# Patient Record
Sex: FEMALE | Race: WHITE | NOT HISPANIC OR LATINO | Employment: FULL TIME | URBAN - METROPOLITAN AREA
[De-identification: names, ages, dates, MRNs, and addresses within clinical notes are randomized per-mention and may not be internally consistent; named-entity substitution may affect disease eponyms.]

---

## 2018-04-22 ENCOUNTER — HOSPITAL ENCOUNTER (EMERGENCY)
Facility: HOSPITAL | Age: 43
Discharge: HOME | End: 2018-04-22
Attending: EMERGENCY MEDICINE | Admitting: EMERGENCY MEDICINE
Payer: COMMERCIAL

## 2018-04-22 ENCOUNTER — APPOINTMENT (EMERGENCY)
Dept: CT IMAGING | Facility: HOSPITAL | Age: 43
End: 2018-04-22
Payer: COMMERCIAL

## 2018-04-22 VITALS
TEMPERATURE: 97.8 F | DIASTOLIC BLOOD PRESSURE: 84 MMHG | WEIGHT: 200 LBS | RESPIRATION RATE: 18 BRPM | BODY MASS INDEX: 33.32 KG/M2 | HEART RATE: 82 BPM | OXYGEN SATURATION: 99 % | SYSTOLIC BLOOD PRESSURE: 133 MMHG | HEIGHT: 65 IN

## 2018-04-22 DIAGNOSIS — R91.8 MULTIPLE PULMONARY NODULES: ICD-10-CM

## 2018-04-22 DIAGNOSIS — S39.012A STRAIN OF MUSCLE, FASCIA AND TENDON OF LOWER BACK, INITIAL ENCOUNTER: ICD-10-CM
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
## 2018-04-22 DIAGNOSIS — S09.90XA MINOR HEAD INJURY WITHOUT LOSS OF CONSCIOUSNESS, INITIAL ENCOUNTER: ICD-10-CM

## 2018-04-22 LAB
ANION GAP BLD CALC-SCNC: 16 MMOL/L (ref 4–13)
BACTERIA UR QL AUTO: ABNORMAL /HPF
BILIRUB UR QL STRIP: NEGATIVE
BUN BLD-MCNC: 19 MG/DL (ref 5–25)
CA-I BLD-SCNC: 1.15 MMOL/L (ref 1.12–1.32)
CHLORIDE BLD-SCNC: 98 MMOL/L (ref 100–108)
CLARITY UR: CLEAR
COLOR UR: YELLOW
CREAT BLD-MCNC: 0.9 MG/DL (ref 0.6–1.3)
EXT PREG TEST URINE: NEGATIVE
GFR SERPL CREATININE-BSD FRML MDRD: 79 ML/MIN/1.73SQ M
GLUCOSE SERPL-MCNC: 120 MG/DL (ref 65–140)
GLUCOSE UR STRIP-MCNC: NEGATIVE MG/DL
HCT VFR BLD CALC: 41 % (ref 34.8–46.1)
HGB BLDA-MCNC: 13.9 G/DL (ref 11.5–15.4)
HGB UR QL STRIP.AUTO: ABNORMAL
HOLD SPECIMEN: NORMAL
HOLD SPECIMEN: NORMAL
KETONES UR STRIP-MCNC: NEGATIVE MG/DL
LEUKOCYTE ESTERASE UR QL STRIP: NEGATIVE
NITRITE UR QL STRIP: NEGATIVE
NON-SQ EPI CELLS URNS QL MICRO: ABNORMAL /HPF
PCO2 BLD: 30 MMOL/L (ref 21–32)
PH UR STRIP.AUTO: 7 [PH] (ref 4.5–8)
POTASSIUM BLD-SCNC: 3.2 MMOL/L (ref 3.5–5.3)
PROT UR STRIP-MCNC: NEGATIVE MG/DL
RBC #/AREA URNS AUTO: ABNORMAL /HPF
SODIUM BLD-SCNC: 140 MMOL/L (ref 136–145)
SP GR UR STRIP.AUTO: 1.01 (ref 1–1.03)
SPECIMEN SOURCE: ABNORMAL
UROBILINOGEN UR QL STRIP.AUTO: 0.2 E.U./DL
WBC #/AREA URNS AUTO: ABNORMAL /HPF

## 2018-04-22 PROCEDURE — 74177 CT ABD & PELVIS W/CONTRAST: CPT

## 2018-04-22 PROCEDURE — 81025 URINE PREGNANCY TEST: CPT | Performed by: EMERGENCY MEDICINE

## 2018-04-22 PROCEDURE — 96374 THER/PROPH/DIAG INJ IV PUSH: CPT

## 2018-04-22 PROCEDURE — 36415 COLL VENOUS BLD VENIPUNCTURE: CPT | Performed by: EMERGENCY MEDICINE

## 2018-04-22 PROCEDURE — 81001 URINALYSIS AUTO W/SCOPE: CPT | Performed by: EMERGENCY MEDICINE

## 2018-04-22 PROCEDURE — 80047 BASIC METABLC PNL IONIZED CA: CPT

## 2018-04-22 PROCEDURE — 96361 HYDRATE IV INFUSION ADD-ON: CPT

## 2018-04-22 PROCEDURE — 70450 CT HEAD/BRAIN W/O DYE: CPT

## 2018-04-22 PROCEDURE — 85014 HEMATOCRIT: CPT

## 2018-04-22 PROCEDURE — 72125 CT NECK SPINE W/O DYE: CPT

## 2018-04-22 PROCEDURE — 71260 CT THORAX DX C+: CPT

## 2018-04-22 PROCEDURE — 99285 EMERGENCY DEPT VISIT HI MDM: CPT

## 2018-04-22 RX ORDER — POTASSIUM CHLORIDE 20 MEQ/1
40 TABLET, EXTENDED RELEASE ORAL ONCE
Status: COMPLETED | OUTPATIENT
Start: 2018-04-22 | End: 2018-04-22

## 2018-04-22 RX ORDER — LIDOCAINE 50 MG/G
1 PATCH TOPICAL DAILY PRN
Qty: 15 PATCH | Refills: 0 | Status: SHIPPED | OUTPATIENT
Start: 2018-04-22 | End: 2021-02-15 | Stop reason: ALTCHOICE

## 2018-04-22 RX ORDER — NAPROXEN 500 MG/1
500 TABLET ORAL EVERY 12 HOURS PRN
Qty: 20 TABLET | Refills: 0 | Status: SHIPPED | OUTPATIENT
Start: 2018-04-22 | End: 2021-02-15 | Stop reason: ALTCHOICE

## 2018-04-22 RX ORDER — CYCLOBENZAPRINE HCL 10 MG
10 TABLET ORAL 3 TIMES DAILY PRN
Qty: 12 TABLET | Refills: 0 | Status: SHIPPED | OUTPATIENT
Start: 2018-04-22 | End: 2021-02-15 | Stop reason: ALTCHOICE

## 2018-04-22 RX ADMIN — POTASSIUM CHLORIDE 40 MEQ: 1500 TABLET, EXTENDED RELEASE ORAL at 14:50

## 2018-04-22 RX ADMIN — IOHEXOL 100 ML: 350 INJECTION, SOLUTION INTRAVENOUS at 14:40

## 2018-04-22 RX ADMIN — HYDROMORPHONE HYDROCHLORIDE 0.5 MG: 1 INJECTION, SOLUTION INTRAMUSCULAR; INTRAVENOUS; SUBCUTANEOUS at 13:55

## 2018-04-22 RX ADMIN — SODIUM CHLORIDE 1000 ML: 0.9 INJECTION, SOLUTION INTRAVENOUS at 13:53

## 2018-04-22 NOTE — ED PROVIDER NOTES
History  Chief Complaint   Patient presents with    Blunt Trauma     Pt reports falling down 10-12 steps and hitting her forehead on the fender of the car around 1100 this morning  Pt with c/o of headache, neck, and back pain with dizziness  Pt denies LOC or N/V  No blood thinners  Patient is a 45-year-old female with past medical and surgical history of hypertension, GERD and cholecystectomy, presents to the emergency department after she fell down 10-12 steps, complaining of frontal headache, left-sided neck pain radiating to the left shoulder as well as right flank pain  Patient states she was walking up steps after coming in from the car her dog jumped on her causing her to fall backwards  She states she tried to twist while falling and ultimately fell down about 10-12 steps  When she landed she struck the forehead against her minivan and front bumper  She also states that her back bent backwards in a "U" and she felt acute pain in the right flank that she describes as a warm gush of fluid  The pain in her right flank radiates into her right lower abdomen and across into the left side of her back  She denies loss of consciousness  She reports mild frontal headache but denies any visual disturbance, tinnitus, change in hearing, dizziness or so she did nausea or vomiting  She also complains of left-sided neck pain that radiates into the left shoulder region  Denies any posterior/midline neck pain  She denies any chest pain, palpitations, dyspnea or pain with breathing, extremity pain/paresthesias/weakness, change in bowel habits, blood per rectum, dysuria, difficulty urinating or hematuria  Rest of review of systems otherwise negative  Patient denies being on any blood thinners          History provided by:  Patient   used: No        None       Past Medical History:   Diagnosis Date    GERD (gastroesophageal reflux disease)     Hypertension        Past Surgical History: Procedure Laterality Date    CHOLECYSTECTOMY         History reviewed  No pertinent family history  I have reviewed and agree with the history as documented  Social History   Substance Use Topics    Smoking status: Never Smoker    Smokeless tobacco: Never Used    Alcohol use Yes      Comment: Socially        Review of Systems   Constitutional: Negative for appetite change, chills, diaphoresis, fatigue and fever  HENT: Negative for congestion, ear pain, hearing loss, rhinorrhea, sore throat and tinnitus  Eyes: Negative for photophobia, pain and visual disturbance  Respiratory: Negative for cough, chest tightness, shortness of breath and wheezing  Cardiovascular: Negative for chest pain and palpitations  Gastrointestinal: Positive for abdominal pain  Negative for abdominal distention, blood in stool, constipation, diarrhea, nausea and vomiting  Genitourinary: Positive for flank pain  Negative for difficulty urinating, dysuria, frequency, hematuria, pelvic pain and vaginal bleeding  Musculoskeletal: Positive for back pain and neck pain  Negative for gait problem, joint swelling and neck stiffness  Skin: Negative for color change, pallor, rash and wound  Allergic/Immunologic: Negative for immunocompromised state  Neurological: Positive for headaches  Negative for dizziness, seizures, syncope, facial asymmetry, speech difficulty, weakness, light-headedness and numbness  Hematological: Negative for adenopathy  Does not bruise/bleed easily  Psychiatric/Behavioral: Negative for confusion and decreased concentration  All other systems reviewed and are negative        Physical Exam  ED Triage Vitals   Temperature Pulse Respirations Blood Pressure SpO2   04/22/18 1315 04/22/18 1326 04/22/18 1326 04/22/18 1326 04/22/18 1326   97 8 °F (36 6 °C) 92 20 165/92 97 %      Temp Source Heart Rate Source Patient Position - Orthostatic VS BP Location FiO2 (%)   04/22/18 1315 04/22/18 1326 04/22/18 1326 04/22/18 1326 --   Oral Monitor Sitting Right arm       Pain Score       04/22/18 1326       6         Vitals:    04/22/18 1315 04/22/18 1326 04/22/18 1451 04/22/18 1525   BP:  165/92 151/81 133/84   BP Location:  Right arm  Left arm   Pulse:  92 99 82   Resp:  20 18 18   Temp: 97 8 °F (36 6 °C)      TempSrc: Oral      SpO2:  97% 98% 99%   Weight:  90 7 kg (200 lb)     Height:  5' 4 5" (1 638 m)       Physical Exam   Constitutional: She is oriented to person, place, and time  She appears well-developed and well-nourished  No distress  HENT:   Head: Normocephalic and atraumatic  Right Ear: External ear normal    Left Ear: External ear normal    Nose: Nose normal    Mouth/Throat: Oropharynx is clear and moist  No oropharyngeal exudate  No significant scalp contusion, hematoma or laceration  Mild frontal bone tenderness  No other facial bone tenderness  Eyes: Conjunctivae and EOM are normal  Pupils are equal, round, and reactive to light  Neck: Normal range of motion  Neck supple  No JVD present  No midline cervical spine tenderness  Full range of motion of neck  Cardiovascular: Normal rate, regular rhythm, normal heart sounds and intact distal pulses  Exam reveals no gallop and no friction rub  No murmur heard  Pulmonary/Chest: Effort normal and breath sounds normal  No respiratory distress  She has no wheezes  She has no rales  She exhibits no tenderness  Abdominal: Soft  Bowel sounds are normal  She exhibits no distension  There is no tenderness  There is no rebound and no guarding  Musculoskeletal: Normal range of motion  She exhibits no edema or tenderness  No midline cervical, thoracic or lumbar spine tenderness  No step-offs  Right flank/thoracolumbar paravertebral region tenderness  No overlying ecchymosis or significant soft tissue swelling  Lymphadenopathy:     She has no cervical adenopathy  Neurological: She is alert and oriented to person, place, and time   No cranial nerve deficit  No gross motor or sensory deficits  5/5 strength throughout  Skin: Skin is warm and dry  No rash noted  She is not diaphoretic  No erythema  No pallor  Psychiatric: She has a normal mood and affect  Her behavior is normal    Nursing note and vitals reviewed  ED Medications  Medications   sodium chloride 0 9 % bolus 1,000 mL (1,000 mL Intravenous New Bag 4/22/18 1353)   HYDROmorphone (DILAUDID) injection 0 5 mg (0 5 mg Intravenous Given 4/22/18 1355)   potassium chloride (K-DUR,KLOR-CON) CR tablet 40 mEq (40 mEq Oral Given 4/22/18 1450)   iohexol (OMNIPAQUE) 350 MG/ML injection (MULTI-DOSE) 100 mL (100 mL Intravenous Given 4/22/18 1440)       Diagnostic Studies  Results Reviewed     Procedure Component Value Units Date/Time    Urine Microscopic [[id number]]  (Abnormal) Collected:  04/22/18 1414    Lab Status:  Final result Specimen:  Urine from Urine, Clean Catch Updated:  04/22/18 1427     RBC, UA 0-1 (A) /hpf      WBC, UA None Seen /hpf      Epithelial Cells Occasional /hpf      Bacteria, UA Occasional /hpf     [place] draw [[id number]] Collected:  04/22/18 1400    Lab Status: In process Specimen:  Blood Updated:  04/22/18 1423    Narrative: The following orders were created for panel order [place] draw    Procedure                               Abnormality         Status                     ---------                               -----------         ------                     Benito Askew Top on OFPZ[[id number]]                            In process                 Gold top on NBIU[[id number]]                                  In process                 Green / Yellow tube on VMKQ[[id number]]                       In process                 Green / Black tube on OZGB[[id number]]                        In process                 Lavender Top 3 ml on LSVQ[[id number]]                         In process                 Lavender Top 7ml on IYAE[[id number]]                          Final result Please view results for these tests on the individual orders  UA w Reflex to Microscopic [[id number]]  (Abnormal) Collected:  04/22/18 1414    Lab Status:  Final result Specimen:  Urine from Urine, Clean Catch Updated:  04/22/18 1420     Color, UA Yellow     Clarity, UA Clear     Specific Gravity, UA 1 010     pH, UA 7 0     Leukocytes, UA Negative     Nitrite, UA Negative     Protein, UA Negative mg/dl      Glucose, UA Negative mg/dl      Ketones, UA Negative mg/dl      Urobilinogen, UA 0 2 E U /dl      Bilirubin, UA Negative     Blood, UA Trace-Intact (A)    POCT pregnancy, urine [[id number]]  (Normal) Resulted:  04/22/18 1417    Lab Status:  Final result Updated:  04/22/18 1417     EXT PREG TEST UR (Ref: Negative) negative    POCT Chem 8+ [[id number]]  (Abnormal) Collected:  04/22/18 1410    Lab Status:  Final result Updated:  04/22/18 1415     SODIUM, I-STAT 140 mmol/l      Potassium, i-STAT 3 2 (L) mmol/L      Chloride, istat 98 (L) mmol/L      CO2, i-STAT 30 mmol/L      Anion Gap, Istat 16 (H) mmol/L      Calcium, Ionized i-STAT 1 15 mmol/L      BUN, I-STAT 19 mg/dl      Creatinine, i-STAT 0 9 mg/dl      eGFR 79 ml/min/1 73sq m      Glucose, i-STAT 120 mg/dl      Hct, i-STAT 41 %      Hgb, i-STAT 13 9 g/dl      Specimen Type VENOUS                 CT chest abdomen pelvis w contrast   Final Result by Eri Boston DO (04/22 1521)   No visceral or osseous injury identified  Scattered tiny pulmonary nodules, largest measuring 3 mm  Based on current Fleischner Society 2017 Guidelines on incidental pulmonary nodule, no routine follow-up is needed if the patient is considered low risk for lung cancer  If the patient is    considered high risk for lung cancer, 12 month follow-up non-contrast chest CT is recommended  Workstation performed: [id number]         CT head without contrast   Final Result by Eri Boston DO (04/22 1520)   No acute intracranial abnormality                    Workstation performed: [id number]         CT cervical spine without contrast   Final Result by Eri Boston DO (04/22 1519)   No cervical spine fracture or traumatic malalignment  Workstation performed: [id number]                    Procedures  Procedures       Phone Contacts  ED Phone Contact    ED Course  ED Course as of Apr 22 1538   Sun Apr 22, 2018   1418 Hemoglobin, Istat: 13 9   1418 eGFR: 79   1418 Will give 40mEq Kdur  POTASSIUM,I-STAT: (!) 3 2   1421 Blood, UA: (!) Trace-Intact   1433 RBC, UA: (!) 0-1   1526 CT scans unremarkable  No acute injury  Updated patient about incidental pulmonary nodules  Patient is not a smoker and has never been a smoker however cancer runs in the family (no h/o lung cancer)  Advised PCP f/u within 1 year in regards to this  From a trauma standpoint, no acute injury  Discussed ED return parameters  MDM  Number of Diagnoses or Management Options  Diagnosis management comments: 27-year-old female presents status post fall down 10-12 steps complaining of right flank pain radiating to abdomen and across back, headache and left-sided neck pain  Based on mechanism of injury, will obtain a CT scan of the head to rule out intracranial bleeding however very low risk overall  Will obtain CT scan of the cervical spine as well as CT scan of the chest, abdomen and pelvis to rule out kidney or intra-abdominal injury as well as spine fracture  Will check Chem i-STAT for kidney function as well as hemoglobin  Will check UA and urine pregnancy test prior to CT  Will give IV fluid bolus and 0 5 mg of IV Dilaudid for pain control         Amount and/or Complexity of Data Reviewed  Clinical lab tests: ordered and reviewed  Tests in the radiology section of CPT®: ordered and reviewed  Independent visualization of images, tracings, or specimens: yes      CritCare Time    Disposition  Final diagnoses:   Fall down steps, initial encounter   Minor head injury without loss of consciousness, initial encounter   Strain of muscle, fascia and tendon of lower back, initial encounter   Multiple pulmonary nodules     Time reflects when diagnosis was documented in both MDM as applicable and the Disposition within this note     Time User Action Codes Description Comment    4/22/2018  3:35 PM Court ACOSTA Add Su Wood Fall down steps, initial encounter     4/22/2018  3:35 PM Court Umair ACOSTA Add [S09 [id number]] Minor head injury without loss of consciousness, initial encounter     4/22/2018  3:35 PM Court Umair ACOSTA Add [[id number]] Strain of muscle, fascia and tendon of lower back, initial encounter     4/22/2018  3:36 PM Court Umair ACOSTA Add [R91 [id number]] Multiple pulmonary nodules       ED Disposition     ED Disposition Condition Comment    Discharge  Timhernando Bacon discharge to home/self care      Condition at discharge: Stable        Follow-up Information     Follow up With Specialties Details Why Contact Info Additional Information    Family doctor  Schedule an appointment as soon as possible for a visit       [facility] Emergency Medicine Go to If symptoms worsen [address] [place], [place] [id number]  [phone] [place] [facility], [address] [place] [address], [id number]        Patient's Medications   Discharge Prescriptions    CYCLOBENZAPRINE (FLEXERIL) 10 MG TABLET    Take 1 tablet (10 mg total) by mouth 3 (three) times a day as needed for muscle spasms       Start Date: 4/22/2018 End Date: --       Order Dose: 10 mg       Quantity: 12 tablet    Refills: 0    LIDOCAINE (LIDODERM) 5 %    Place 1 patch on the skin daily as needed for mild pain or moderate pain Remove & Discard patch within 12 hours or as directed by MD       Start Date: 4/22/2018 End Date: --       Order Dose: 1 patch       Quantity: 15 patch    Refills: 0    NAPROXEN (NAPROSYN) 500 MG TABLET    Take 1 tablet (500 mg total) by mouth every 12 (twelve) hours as needed for mild pain, moderate pain or headaches       Start Date: 4/22/2018 End Date: --       Order Dose: 500 mg       Quantity: 20 tablet    Refills: 0     No discharge procedures on file      ED Provider  Electronically Signed by           Abrahan Vyas DO  04/22/18 1535

## 2018-04-22 NOTE — DISCHARGE INSTRUCTIONS
Head Injury   WHAT YOU NEED TO KNOW:   A head injury is most often caused by a blow to the head  This may occur from a fall, bicycle injury, sports injury, being struck in the head, or a motor vehicle accident  DISCHARGE INSTRUCTIONS:   Call 911 or have someone else call for any of the following:   · You cannot be woken  · You have a seizure  · You stop responding to others or you faint  · You have blurry or double vision  · Your speech becomes slurred or confused  · You have arm or leg weakness, loss of feeling, or new problems with coordination  · Your pupils are larger than usual or one pupil is a different size than the other  · You have blood or clear fluid coming out of your ears or nose  Seek care immediately if:   · You have repeated or forceful vomiting  · You feel confused  · Your headache gets worse or becomes severe  · You or someone caring for you notices that you are harder to wake than usual   Contact your healthcare provider if:   · Your symptoms last longer than 6 weeks after the injury  · You have questions or concerns about your condition or care  Medicines:   · Acetaminophen  decreases pain  Acetaminophen is available without a doctor's order  Ask how much to take and how often to take it  Follow directions  Acetaminophen can cause liver damage if not taken correctly  · Take your medicine as directed  Contact your healthcare provider if you think your medicine is not helping or if you have side effects  Tell him or her if you are allergic to any medicine  Keep a list of the medicines, vitamins, and herbs you take  Include the amounts, and when and why you take them  Bring the list or the pill bottles to follow-up visits  Carry your medicine list with you in case of an emergency  Self-care:   · Rest  or do quiet activities for 24 to 48 hours  Limit your time watching TV, using the computer, or doing tasks that require a lot of thinking   Slowly return to your normal activities as directed  Do not play sports or do activities that may cause you to get hit in the head  Ask your healthcare provider when you can return to sports  · Apply ice  on your head for 15 to 20 minutes every hour or as directed  Use an ice pack, or put crushed ice in a plastic bag  Cover it with a towel before you apply it to your skin  Ice helps prevent tissue damage and decreases swelling and pain  · Have someone stay with you for 24 hours  or as directed  This person can monitor you for complications and call 737  When you are awake the person should ask you a few questions to see if you are thinking clearly  An example would be to ask your name or your address  Prevent another head injury:   · Wear a helmet that fits properly  Do this when you play sports, or ride a bike, scooter, or skateboard  Helmets help decrease your risk of a serious head injury  Talk to your healthcare provider about other ways you can protect yourself if you play sports  · Wear your seat belt every time you are in a car  This helps to decrease your risk for a head injury if you are in a car accident  Follow up with your healthcare provider as directed:  Write down your questions so you remember to ask them during your visits  © 2017 [employer]  Information is for End User's use only and may not be sold, redistributed or otherwise used for commercial purposes  All illustrations and images included in CareNotes® are the copyrighted property of [employer]  or [employer]  The above information is an  only  It is not intended as medical advice for individual conditions or treatments  Talk to your doctor, nurse or pharmacist before following any medical regimen to see if it is safe and effective for you  Low Back Strain   WHAT YOU NEED TO KNOW:   Low back strain is an injury to your lower back muscles or tendons   Tendons are strong tissues that connect muscles to bones  The lower back supports most of your body weight and helps you move, twist, and bend  DISCHARGE INSTRUCTIONS:   Seek care immediately if:   · You hear or feel a pop in your lower back  · You have increased swelling or pain in your lower back  · You have trouble moving your legs  · Your legs are numb  Contact your healthcare provider if:   · You have a fever  · Your pain does not go away, even after treatment  · You have questions or concerns about your condition or care  Medicines: The following medicines may be ordered by your healthcare provider:  · Acetaminophen decreases pain and fever  It is available without a doctor's order  Ask how much to take and how often to take it  Follow directions  Acetaminophen can cause liver damage if not taken correctly  · NSAIDs , such as ibuprofen, help decrease swelling, pain, and fever  This medicine is available with or without a doctor's order  NSAIDs can cause stomach bleeding or kidney problems in certain people  If you take blood thinner medicine, always ask your healthcare provider if NSAIDs are safe for you  Always read the medicine label and follow directions  · Muscle relaxers  help decrease pain and muscle spasms  · Prescription pain medicine  may be given  Ask how to take this medicine safely  · Take your medicine as directed  Contact your healthcare provider if you think your medicine is not helping or if you have side effects  Tell him or her if you are allergic to any medicine  Keep a list of the medicines, vitamins, and herbs you take  Include the amounts, and when and why you take them  Bring the list or the pill bottles to follow-up visits  Carry your medicine list with you in case of an emergency  Self-care:   · Rest  as directed  You may need to rest in bed for a period of time after your injury  Do not lift heavy objects  · Apply ice  on your back for 15 to 20 minutes every hour or as directed   Use an ice pack, or put crushed ice in a plastic bag  Cover it with a towel  Ice helps prevent tissue damage and decreases swelling and pain  · Apply heat  on your lower back for 20 to 30 minutes every 2 hours for as many days as directed  Heat helps decrease pain and muscle spasms  · Slowly start to increase your activity  as the pain decreases, or as directed  Prevent another low back strain:   · Use correct body movements  ¨ Bend at the hips and knees when you  objects  Do not bend from the waist  Use your leg muscles as you lift the load  Do not use your back  Keep the object close to your chest as you lift it  Try not to twist or lift anything above your waist     ¨ Change your position often when you stand for long periods of time  Rest one foot on a small box or footrest, and then switch to the other foot often  ¨ Try not to sit for long periods of time  When you do, sit in a straight-backed chair with your feet flat on the floor  ¨ Never reach, pull, or push while you are sitting  · Warm up before you exercise  Do exercises that strengthen your back muscles  Ask your healthcare provider about the best exercise plan for you  · Maintain a healthy weight  Ask your healthcare provider how much you should weigh  Ask him to help you create a weight loss plan if you are overweight  Follow up with your healthcare provider as directed:  Write down your questions so you remember to ask them during your visits  © 2017 [facility] Information is for End User's use only and may not be sold, redistributed or otherwise used for commercial purposes  All illustrations and images included in CareNotes® are the copyrighted property of [employer] M [employer] Inc  or [employer]  The above information is an  only  It is not intended as medical advice for individual conditions or treatments   Talk to your doctor, nurse or pharmacist before following any medical regimen to see if it is safe and effective for you

## 2021-02-15 ENCOUNTER — OFFICE VISIT (OUTPATIENT)
Dept: GASTROENTEROLOGY | Facility: CLINIC | Age: 46
End: 2021-02-15
Payer: COMMERCIAL

## 2021-02-15 VITALS
DIASTOLIC BLOOD PRESSURE: 82 MMHG | HEIGHT: 64 IN | HEART RATE: 84 BPM | SYSTOLIC BLOOD PRESSURE: 124 MMHG | BODY MASS INDEX: 37.22 KG/M2 | WEIGHT: 218 LBS

## 2021-02-15 DIAGNOSIS — K21.9 GASTROESOPHAGEAL REFLUX DISEASE, UNSPECIFIED WHETHER ESOPHAGITIS PRESENT: Primary | ICD-10-CM

## 2021-02-15 DIAGNOSIS — Z12.11 SCREENING FOR COLON CANCER: ICD-10-CM

## 2021-02-15 PROCEDURE — 99204 OFFICE O/P NEW MOD 45 MIN: CPT | Performed by: PHYSICIAN ASSISTANT

## 2021-02-15 RX ORDER — OMEPRAZOLE 40 MG/1
40 CAPSULE, DELAYED RELEASE ORAL DAILY
COMMUNITY
Start: 2021-01-07

## 2021-02-15 RX ORDER — AMLODIPINE, VALSARTAN AND HYDROCHLOROTHIAZIDE 10; 320; 25 MG/1; MG/1; MG/1
1 TABLET ORAL
COMMUNITY
Start: 2021-01-26

## 2021-02-15 RX ORDER — LIRAGLUTIDE 6 MG/ML
3 INJECTION, SOLUTION SUBCUTANEOUS DAILY
COMMUNITY
Start: 2021-01-26

## 2021-02-15 RX ORDER — DIPHENOXYLATE HYDROCHLORIDE AND ATROPINE SULFATE 2.5; .025 MG/1; MG/1
1 TABLET ORAL DAILY
COMMUNITY

## 2021-02-15 NOTE — PROGRESS NOTES
[facility] - Outpatient Consultation  Meghan Bryant 55 y o  female MRN: [id number]  Encounter: [id number]          ASSESSMENT AND PLAN:      1  Gastroesophageal reflux disease, unspecified whether esophagitis present    Patient reports a long history of GERD x > 10 years  She is currently maintained on Omeprazole 40mg po daily and PPI dependent  Will plan for EGD to investigate for Buck's esophagus, hiatal hernia, etc   Continue current treatment regimen  Long term PPI risks reviewed  GERD modifications reviewed  She is working on weight loss and is interested in trying to wean down/off her PPI after her weight loss goal is achieved  2  Screening for colon cancer    No family history of colon cancer or polyps  Will plan for colonoscopy to investigate   ______________________________________________________________________    HPI:  Patient is a 55year old female who presents to the office for an evaluation of chronic GERD and a screening colonoscopy  Patient reports a long history of GERD for over 10 years  She is maintained on Omeprazole 40mg po daily  She reports she required this dose increase a few years ago  She also reports if she misses a dose, her heartburn will return  She states on the medication her symptoms are well controlled  She denies dysphagia  No vomiting  No abdominal pain  No blood in the stool  She has never had an EGD  She is currently working on weight loss and was put on the medication Saxenda for this  She reports she had a colonoscopy in 2005 for rectal bleeding which was normal   No family history of colon cancer or polyps  REVIEW OF SYSTEMS:    CONSTITUTIONAL: Denies any fever, chills, rigors, and weight loss  HEENT: No earache or tinnitus  Denies hearing loss or visual disturbances  CARDIOVASCULAR: No chest pain or palpitations     RESPIRATORY: Denies any cough, hemoptysis, shortness of breath or dyspnea on exertion  GASTROINTESTINAL: As noted in the History of Present Illness  GENITOURINARY: No problems with urination  Denies any hematuria or dysuria  NEUROLOGIC: No dizziness or vertigo, denies headaches  MUSCULOSKELETAL: Denies any muscle or joint pain  SKIN: Denies skin rashes or itching  ENDOCRINE: Denies excessive thirst  Denies intolerance to heat or cold  PSYCHOSOCIAL: Denies depression or anxiety  Denies any recent memory loss  Historical Information   Past Medical History:   Diagnosis Date    GERD (gastroesophageal reflux disease)     Hypertension      Past Surgical History:   Procedure Laterality Date    BREAST LUMPECTOMY Left 11/2019    CHOLECYSTECTOMY       Social History   Social History     Substance and Sexual Activity   Alcohol Use Yes    Comment: Socially     Social History     Substance and Sexual Activity   Drug Use No     Social History     Tobacco Use   Smoking Status Never Smoker   Smokeless Tobacco Never Used     Family History   Problem Relation Age of Onset    Cancer Sister        Meds/Allergies       Current Outpatient Medications:     amLODIPine-Valsartan-HCTZ -25 MG TABS    levonorgestrel (MIRENA) 20 MCG/24HR IUD    liraglutide (Saxenda) injection    multivitamin (THERAGRAN) TABS    omeprazole (PriLOSEC) 40 MG capsule    Allergies   Allergen Reactions    Clindamycin     Macrobid [Nitrofurantoin]     Ciprofloxacin Rash           Objective     Blood pressure 124/82, pulse 84, height 5' 4" (1 626 m), weight 98 9 kg (218 lb)  Body mass index is 37 42 kg/m²  PHYSICAL EXAM:      General Appearance:   Alert, cooperative, no distress   HEENT:   Normocephalic, atraumatic, anicteric   Neck:  Supple, symmetrical, trachea midline   Lungs:   Clear to auscultation bilaterally; no rales, rhonchi or wheezing; respirations unlabored    Heart[de-identified]   Regular rate and rhythm; no murmur, rub, or gallop     Abdomen:   Soft, non-tender, non-distended; normal bowel sounds; no masses, no organomegaly    Genitalia:   Deferred    Rectal:   Deferred    Extremities:  No cyanosis, clubbing or edema    Pulses:  2+ and symmetric    Skin:  No jaundice, rashes, or lesions    Lymph nodes:  No palpable cervical lymphadenopathy        Lab Results:   No visits with results within 1 Day(s) from this visit  Latest known visit with results is:   Admission on 04/22/2018, Discharged on 04/22/2018   Component Date Value    Color, UA 04/22/2018 Yellow     Clarity, UA 04/22/2018 Clear     Specific Gravity, UA 04/22/2018 1 010     pH, UA 04/22/2018 7 0     Leukocytes, UA 04/22/2018 Negative     Nitrite, UA 04/22/2018 Negative     Protein, UA 04/22/2018 Negative     Glucose, UA 04/22/2018 Negative     Ketones, UA 04/22/2018 Negative     Urobilinogen, UA 04/22/2018 0 2     Bilirubin, UA 04/22/2018 Negative     Blood, UA 04/22/2018 Trace-Intact*    EXT PREG TEST UR (Ref: N* 04/22/2018 negative     Extra Tube 04/22/2018 Hold for add-ons   Extra Tube 04/22/2018 Hold for add-ons   SODIUM, I-STAT 04/22/2018 140     Potassium, i-STAT 04/22/2018 3 2*    Chloride, istat 04/22/2018 98*    CO2, i-STAT 04/22/2018 30     Anion Gap, i-STAT 04/22/2018 16*    Calcium, Ionized i-STAT 04/22/2018 1 15     BUN, I-STAT 04/22/2018 19     Creatinine, i-STAT 04/22/2018 0 9     eGFR 04/22/2018 79     Glucose, i-STAT 04/22/2018 120     Hct, i-STAT 04/22/2018 41     Hgb, i-STAT 04/22/2018 13 9     Specimen Type 04/22/2018 VENOUS     RBC, UA 04/22/2018 0-1*    WBC, UA 04/22/2018 None Seen     Epithelial Cells 04/22/2018 Occasional     Bacteria, UA 04/22/2018 Occasional          Radiology Results:   No results found

## 2021-03-10 DIAGNOSIS — Z23 ENCOUNTER FOR IMMUNIZATION: ICD-10-CM

## 2021-03-19 ENCOUNTER — ANESTHESIA EVENT (OUTPATIENT)
Dept: GASTROENTEROLOGY | Facility: HOSPITAL | Age: 46
End: 2021-03-19

## 2021-03-20 ENCOUNTER — ANESTHESIA (OUTPATIENT)
Dept: GASTROENTEROLOGY | Facility: HOSPITAL | Age: 46
End: 2021-03-20

## 2021-03-20 ENCOUNTER — HOSPITAL ENCOUNTER (OUTPATIENT)
Dept: GASTROENTEROLOGY | Facility: HOSPITAL | Age: 46
Setting detail: OUTPATIENT SURGERY
Discharge: HOME | End: 2021-03-20
Attending: INTERNAL MEDICINE | Admitting: INTERNAL MEDICINE
Payer: COMMERCIAL

## 2021-03-20 VITALS
SYSTOLIC BLOOD PRESSURE: 132 MMHG | HEART RATE: 76 BPM | TEMPERATURE: 98.5 F | WEIGHT: 212.3 LBS | HEIGHT: 64 IN | DIASTOLIC BLOOD PRESSURE: 82 MMHG | OXYGEN SATURATION: 98 % | RESPIRATION RATE: 10 BRPM | BODY MASS INDEX: 36.25 KG/M2

## 2021-03-20 DIAGNOSIS — Z12.11 SCREENING FOR COLON CANCER: ICD-10-CM

## 2021-03-20 DIAGNOSIS — K21.9 GASTROESOPHAGEAL REFLUX DISEASE, UNSPECIFIED WHETHER ESOPHAGITIS PRESENT: ICD-10-CM

## 2021-03-20 LAB
EXT PREGNANCY TEST URINE: NEGATIVE
EXT. CONTROL: NORMAL

## 2021-03-20 PROCEDURE — 45385 COLONOSCOPY W/LESION REMOVAL: CPT | Performed by: INTERNAL MEDICINE

## 2021-03-20 PROCEDURE — 43239 EGD BIOPSY SINGLE/MULTIPLE: CPT | Performed by: INTERNAL MEDICINE

## 2021-03-20 PROCEDURE — 88305 TISSUE EXAM BY PATHOLOGIST: CPT | Performed by: PATHOLOGY

## 2021-03-20 PROCEDURE — 81025 URINE PREGNANCY TEST: CPT | Performed by: STUDENT IN AN ORGANIZED HEALTH CARE EDUCATION/TRAINING PROGRAM

## 2021-03-20 RX ORDER — PROPOFOL 10 MG/ML
INJECTION, EMULSION INTRAVENOUS AS NEEDED
Status: DISCONTINUED | OUTPATIENT
Start: 2021-03-20 | End: 2021-03-20

## 2021-03-20 RX ORDER — METOCLOPRAMIDE HYDROCHLORIDE 5 MG/ML
10 INJECTION INTRAMUSCULAR; INTRAVENOUS EVERY 6 HOURS PRN
Status: DISCONTINUED | OUTPATIENT
Start: 2021-03-20 | End: 2021-03-24 | Stop reason: HOSPADM

## 2021-03-20 RX ORDER — LIDOCAINE HYDROCHLORIDE 10 MG/ML
INJECTION, SOLUTION EPIDURAL; INFILTRATION; INTRACAUDAL; PERINEURAL AS NEEDED
Status: DISCONTINUED | OUTPATIENT
Start: 2021-03-20 | End: 2021-03-20

## 2021-03-20 RX ORDER — SODIUM CHLORIDE, SODIUM LACTATE, POTASSIUM CHLORIDE, CALCIUM CHLORIDE 600; 310; 30; 20 MG/100ML; MG/100ML; MG/100ML; MG/100ML
100 INJECTION, SOLUTION INTRAVENOUS CONTINUOUS
Status: DISCONTINUED | OUTPATIENT
Start: 2021-03-20 | End: 2021-03-24 | Stop reason: HOSPADM

## 2021-03-20 RX ADMIN — METOCLOPRAMIDE HYDROCHLORIDE 10 MG: 5 INJECTION INTRAMUSCULAR; INTRAVENOUS at 08:54

## 2021-03-20 RX ADMIN — SODIUM CHLORIDE, SODIUM LACTATE, POTASSIUM CHLORIDE, AND CALCIUM CHLORIDE 100 ML/HR: .6; .31; .03; .02 INJECTION, SOLUTION INTRAVENOUS at 07:36

## 2021-03-20 RX ADMIN — PROPOFOL 150 MG: 10 INJECTION, EMULSION INTRAVENOUS at 08:06

## 2021-03-20 RX ADMIN — PROPOFOL 100 MG: 10 INJECTION, EMULSION INTRAVENOUS at 08:10

## 2021-03-20 RX ADMIN — LIDOCAINE HYDROCHLORIDE 50 MG: 10 INJECTION, SOLUTION EPIDURAL; INFILTRATION; INTRACAUDAL; PERINEURAL at 07:58

## 2021-03-20 RX ADMIN — PROPOFOL 30 MG: 10 INJECTION, EMULSION INTRAVENOUS at 08:13

## 2021-03-20 RX ADMIN — SODIUM CHLORIDE, SODIUM LACTATE, POTASSIUM CHLORIDE, AND CALCIUM CHLORIDE 100 ML/HR: .6; .31; .03; .02 INJECTION, SOLUTION INTRAVENOUS at 07:24

## 2021-03-20 RX ADMIN — PROPOFOL 220 MG: 10 INJECTION, EMULSION INTRAVENOUS at 08:01

## 2021-03-20 NOTE — ANESTHESIA POSTPROCEDURE EVALUATION
Post-Op Assessment Note    CV Status:  Stable    Pain management: adequate     Mental Status:  Awake   PONV Controlled:  Controlled   Airway Patency:  Patent      Post Op Vitals Reviewed: Yes      Staff: CRNA         No complications documented      /64   Temp      Pulse 88   Resp   20   SpO2   100

## 2021-03-20 NOTE — H&P
History and Physical -  [facility]  Amanda Garrido 55 y o  female MRN: [id number]                  HPI: Amanda Garrido is a 55y o  year old female who presents for EGD and colonoscopy for chronic GERD, initial average risk screen  REVIEW OF SYSTEMS: Per the HPI, and otherwise unremarkable  Historical Information   Past Medical History:   Diagnosis Date    GERD (gastroesophageal reflux disease)     Hypertension      Past Surgical History:   Procedure Laterality Date    BREAST LUMPECTOMY Left 11/2019    CHOLECYSTECTOMY       Social History   Social History     Substance and Sexual Activity   Alcohol Use Yes    Comment: Socially     Social History     Substance and Sexual Activity   Drug Use No     Social History     Tobacco Use   Smoking Status Never Smoker   Smokeless Tobacco Never Used     Family History   Problem Relation Age of Onset    Cancer Sister        Meds/Allergies     (Not in a hospital admission)      Allergies   Allergen Reactions    Clindamycin     Macrobid [Nitrofurantoin]     Ciprofloxacin Rash       Objective     Blood pressure 152/83, pulse (!) 107, temperature 97 6 °F (36 4 °C), temperature source Temporal, resp  rate 16, height 5' 4" (1 626 m), weight 96 3 kg (212 lb 4 9 oz), SpO2 100 %        PHYSICAL EXAM    Gen: NAD  CV: RRR  CHEST: Clear  ABD: soft, NT/ND  EXT: no edema  Neuro: AAO      ASSESSMENT/PLAN:  This is a 55y o  year old female here for chronic GERD, initial average risk screening    PLAN:   Procedure:  EGD colonoscopy

## 2021-03-20 NOTE — DISCHARGE INSTRUCTIONS
Upper Endoscopy   WHAT YOU NEED TO KNOW:   An upper endoscopy is also called an upper gastrointestinal (GI) endoscopy, or an esophagogastroduodenoscopy (EGD)  You may feel bloated, gassy, or have some abdominal discomfort after your procedure  Your throat may be sore for 24 to 36 hours  You may burp or pass gas from air that is still inside your body  DISCHARGE INSTRUCTIONS:   Call 911 for any of the following:   · You have sudden chest pain or trouble breathing  Seek care immediately if:   · You feel dizzy or faint  · You have trouble swallowing  · Your bowel movements are very dark or black  · Your abdomen is hard and firm and you have severe pain  · You vomit blood  Contact your healthcare provider if:   · You feel full or bloated and cannot burp or pass gas  · You have not had a bowel movement for 3 days after your procedure  · You have neck pain  · You have a fever or chills  · You have nausea or are vomiting  · You have a rash or hives  · You have questions or concerns about your endoscopy  Relieve a sore throat:  Suck on throat lozenges or crushed ice  Gargle with a small amount of warm salt water  Mix 1 teaspoon of salt and 1 cup of warm water to make salt water  Relieve gas and discomfort from bloating:  Lie on your right side with a heating pad on your abdomen  Take short walks to help pass gas  Eat small meals until bloating is relieved  Rest after your procedure: You have been given medicine to relax you  Do not  drive or make important decisions until the day after your procedure  Return to your normal activity as directed  You can usually return to work the day after your procedure  Follow up with your healthcare provider as directed:  Write down your questions so you remember to ask them during your visits     © 2017 [address] is for End User's use only and may not be sold, redistributed or otherwise used for commercial purposes  All illustrations and images included in CareNotes® are the copyrighted property of [employer]  or [employer]  The above information is an  only  It is not intended as medical advice for individual conditions or treatments  Talk to your doctor, nurse or pharmacist before following any medical regimen to see if it is safe and effective for you  Colonoscopy   WHAT YOU NEED TO KNOW:   A colonoscopy is a procedure to examine the inside of your colon (intestine) with a scope  Polyps or tissue growths may have been removed during your colonoscopy  It is normal to feel bloated and to have some abdominal discomfort  You should be passing gas  If you have hemorrhoids or you had polyps removed, you may have a small amount of bleeding  DISCHARGE INSTRUCTIONS:   Seek care immediately if:   · You have a large amount of bright red blood in your bowel movements  · Your abdomen is hard and firm and you have severe pain  · You have sudden trouble breathing  Contact your healthcare provider if:   · You develop a rash or hives  · You have a fever within 24 hours of your procedure       · You have not had a bowel movement for 3 days after your procedure  · You have questions or concerns about your condition or care  Activity:   · Do not lift, strain, or run  for 3 days after your procedure  · Rest after your procedure  You have been given medicine to relax you  Do not  drive or make important decisions until the day after your procedure  Return to your normal activity as directed  · Relieve gas and discomfort from bloating  by lying on your right side with a heating pad on your abdomen  You may need to take short walks to help the gas move out  Eat small meals until bloating is relieved  If you had polyps removed: For 7 days after your procedure:  · Do not  take aspirin  · Do not  go on long car rides    Follow up with your healthcare provider as directed: Write down your questions so you remember to ask them during your visits  © 2017 [address] is for End User's use only and may not be sold, redistributed or otherwise used for commercial purposes  All illustrations and images included in CareNotes® are the copyrighted property of [employer]  or [employer]  The above information is an  only  It is not intended as medical advice for individual conditions or treatments  Talk to your doctor, nurse or pharmacist before following any medical regimen to see if it is safe and effective for you

## 2021-03-20 NOTE — ANESTHESIA PREPROCEDURE EVALUATION
Procedure:  COLONOSCOPY  EGD    Relevant Problems   ANESTHESIA   (+) PONV (postoperative nausea and vomiting) (with fentanyl/versed for prior GI procedure)      CARDIO   (+) Hypertension      GI/HEPATIC   (+) GERD (gastroesophageal reflux disease)      PULMONARY  COVID 12/2020 - mild and resolved   (+) Asthma (exercise induced, last inhaler use 12/2020)   (-) Smoking   (-) URI (upper respiratory infection)    BMI 36    Physical Exam    Airway    Mallampati score: I  TM Distance: >3 FB  Neck ROM: full     Dental   No notable dental hx     Cardiovascular      Pulmonary      Other Findings           Anesthesia Plan  ASA Score- 2     Anesthesia Type- IV sedation with anesthesia with ASA Monitors  Additional Monitors:   Airway Plan:           Plan Factors-Exercise tolerance (METS): >4 METS  Chart reviewed  Existing labs reviewed  Patient summary reviewed  Patient is not a current smoker  Induction- intravenous  Postoperative Plan-     Informed Consent- Anesthetic plan and risks discussed with patient  I personally reviewed this patient with the CRNA  Discussed and agreed on the Anesthesia Plan with the VALORIE Irene

## 2021-08-09 ENCOUNTER — OFFICE VISIT (OUTPATIENT)
Dept: BARIATRICS | Facility: CLINIC | Age: 46
End: 2021-08-09
Payer: COMMERCIAL

## 2021-08-09 ENCOUNTER — APPOINTMENT (OUTPATIENT)
Dept: LAB | Facility: HOSPITAL | Age: 46
End: 2021-08-09
Payer: COMMERCIAL

## 2021-08-09 ENCOUNTER — OFFICE VISIT (OUTPATIENT)
Dept: LAB | Facility: HOSPITAL | Age: 46
End: 2021-08-09
Payer: COMMERCIAL

## 2021-08-09 VITALS
HEART RATE: 80 BPM | BODY MASS INDEX: 37.75 KG/M2 | SYSTOLIC BLOOD PRESSURE: 136 MMHG | DIASTOLIC BLOOD PRESSURE: 80 MMHG | WEIGHT: 226.6 LBS | RESPIRATION RATE: 12 BRPM | HEIGHT: 65 IN | TEMPERATURE: 99.2 F

## 2021-08-09 DIAGNOSIS — E78.2 MIXED HYPERLIPIDEMIA: ICD-10-CM

## 2021-08-09 DIAGNOSIS — I10 ESSENTIAL HYPERTENSION: ICD-10-CM

## 2021-08-09 DIAGNOSIS — Z91.89 AT RISK FOR OBSTRUCTIVE SLEEP APNEA: ICD-10-CM

## 2021-08-09 DIAGNOSIS — E66.01 SEVERE OBESITY (BMI 35.0-39.9) WITH COMORBIDITY (HCC): ICD-10-CM

## 2021-08-09 DIAGNOSIS — R63.5 ABNORMAL WEIGHT GAIN: ICD-10-CM

## 2021-08-09 DIAGNOSIS — K21.9 GERD (GASTROESOPHAGEAL REFLUX DISEASE): ICD-10-CM

## 2021-08-09 DIAGNOSIS — E66.01 SEVERE OBESITY (BMI 35.0-39.9) WITH COMORBIDITY (HCC): Primary | ICD-10-CM

## 2021-08-09 LAB
ATRIAL RATE: 64 BPM
MAGNESIUM SERPL-MCNC: 2 MG/DL (ref 1.6–2.6)
P AXIS: -6 DEGREES
PR INTERVAL: 156 MS
QRS AXIS: -31 DEGREES
QRSD INTERVAL: 84 MS
QT INTERVAL: 446 MS
QTC INTERVAL: 460 MS
T WAVE AXIS: 9 DEGREES
VENTRICULAR RATE: 64 BPM

## 2021-08-09 PROCEDURE — 99215 OFFICE O/P EST HI 40 MIN: CPT | Performed by: INTERNAL MEDICINE

## 2021-08-09 PROCEDURE — 36415 COLL VENOUS BLD VENIPUNCTURE: CPT

## 2021-08-09 PROCEDURE — 83735 ASSAY OF MAGNESIUM: CPT

## 2021-08-09 PROCEDURE — 93005 ELECTROCARDIOGRAM TRACING: CPT

## 2021-08-09 PROCEDURE — 93010 ELECTROCARDIOGRAM REPORT: CPT | Performed by: INTERNAL MEDICINE

## 2021-08-09 RX ORDER — AMLODIPINE AND VALSARTAN 10; 320 MG/1; MG/1
1 TABLET ORAL DAILY
COMMUNITY
Start: 2021-04-22

## 2021-08-09 RX ORDER — HYDROCHLOROTHIAZIDE 25 MG/1
25 TABLET ORAL DAILY
COMMUNITY
Start: 2021-04-22
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
## 2021-08-18 ENCOUNTER — OFFICE VISIT (OUTPATIENT)
Dept: BARIATRICS | Facility: CLINIC | Age: 46
End: 2021-08-18

## 2021-08-18 VITALS — WEIGHT: 227.4 LBS | HEIGHT: 65 IN | BODY MASS INDEX: 37.89 KG/M2

## 2021-08-18 DIAGNOSIS — R63.5 ABNORMAL WEIGHT GAIN: ICD-10-CM

## 2021-08-18 PROCEDURE — RECHECK

## 2021-08-18 PROCEDURE — VLCD

## 2021-08-31 ENCOUNTER — OFFICE VISIT (OUTPATIENT)
Dept: BARIATRICS | Facility: CLINIC | Age: 46
End: 2021-08-31

## 2021-08-31 DIAGNOSIS — R63.5 ABNORMAL WEIGHT GAIN: Primary | ICD-10-CM

## 2021-08-31 PROCEDURE — VLCD

## 2021-08-31 PROCEDURE — RECHECK

## 2021-08-31 NOTE — PROGRESS NOTES
Weight Management Medical Nutrition Assessment  Alan Gandhi was here today for her 2 week VLCD follow-up  Today she weighs 217 2 lbs giving her a loss of 10 lbs in the last 2 weeks  She reports that the first 2 days "were tough but now I feel normal "  She is tolerating product with no difficulty and has no constipation at this time  She has and egg or string cheese on her busier days if needed  She is tracking her water and has been getting 80 -90 oz daily  Labs ordered, and bp taken  She will continue at this time  Patient seen by Medical Provider in past 6 months:  yes  Requested to schedule appointment with Medical Provider: No      Anthropometric Measurements  Start Weight (#): 227 2  Current Weight (#): 217 2   TBW % Change from start weight:4%  Ideal Body Weight (#):125  Goal Weight (#):175    Weight Loss History  Previous weight loss attempts: Commercial Programs (Weight Watchers, Artemio Shines, etc )    Food and Nutrition Related History  Wake up: 6 am   Bed Time: 10 - 11    Food Recall  Breakfast:meal replacement    Snack:  Lunch:meal replacement  Snack:bar  Dinner:meal replacement  Snack:      Beverages: water  Volume of beverage intake: 80    Weekends: Same  Cravings: sweets  Trouble area of day:afternoon    Frequency of Eating out: irregularly  Food restrictions:none  Cooking: self   Food Shopping: self    Physical Activity Intake  Activity:none currently  Frequency:rarely  Physical limitations/barriers to exercise: none    Estimated Needs  Energy    [employer] Needs: BMR : 9654   1-2# loss weekly sedentary:  951 - 1451            1-2# loss weekly lightly active:1236 - 1736  Maintenance calories for sedentary activity level: 1951  Protein:68 - 85      (1 2-1 5g/kg IBW)  Fluid: 66    (35mL/kg IBW)    Nutrition Diagnosis  Yes; Overweight/obesity  related to Excess energy intake as evidenced by  BMI more than normative standard for age and sex (obesity-grade II 35-39  9)       Nutrition Intervention    Nutrition Prescription  Calories:760  Protein:96  Fluid:80    Meal Plan (Ron/Pro/Carb)  Breakfast: 200/27/10  Snack:  Lunch:200/27/10  Snack:160/15/12  Dinner:200/27/10  Snack:    Nutrition Education:    Calorie controlled menu  Lean protein food choices  Healthy snack options  Food journaling tips      Nutrition Counseling:  Strategies: meal planning, portion sizes, healthy snack choices, hydration, fiber intake, protein intake, exercise, food journal      Monitoring and Evaluation:  Evaluation criteria:  Energy Intake  Meet protein needs  Maintain adequate hydration  Monitor weekly weight  Meal planning/preparation  Food journal   Decreased portions at mealtimes and snacks  Physical activity     Barriers to learning:none  Readiness to change: Action:  (Changing behavior)  Comprehension: good  Expected Compliance: good

## 2021-09-01 VITALS
WEIGHT: 217.2 LBS | HEIGHT: 65 IN | SYSTOLIC BLOOD PRESSURE: 130 MMHG | BODY MASS INDEX: 36.19 KG/M2 | DIASTOLIC BLOOD PRESSURE: 78 MMHG

## 2021-09-03 ENCOUNTER — APPOINTMENT (OUTPATIENT)
Dept: LAB | Facility: CLINIC | Age: 46
End: 2021-09-03
Payer: COMMERCIAL

## 2021-09-03 DIAGNOSIS — R63.5 ABNORMAL WEIGHT GAIN: ICD-10-CM

## 2021-09-03 LAB
ALBUMIN SERPL BCP-MCNC: 4.2 G/DL (ref 3.5–5)
ALP SERPL-CCNC: 53 U/L (ref 46–116)
ALT SERPL W P-5'-P-CCNC: 94 U/L (ref 12–78)
ANION GAP SERPL CALCULATED.3IONS-SCNC: 7 MMOL/L (ref 4–13)
AST SERPL W P-5'-P-CCNC: 36 U/L (ref 5–45)
BILIRUB SERPL-MCNC: 0.56 MG/DL (ref 0.2–1)
BUN SERPL-MCNC: 16 MG/DL (ref 5–25)
CALCIUM SERPL-MCNC: 9.1 MG/DL (ref 8.3–10.1)
CHLORIDE SERPL-SCNC: 105 MMOL/L (ref 100–108)
CO2 SERPL-SCNC: 27 MMOL/L (ref 21–32)
CREAT SERPL-MCNC: 0.84 MG/DL (ref 0.6–1.3)
GFR SERPL CREATININE-BSD FRML MDRD: 84 ML/MIN/1.73SQ M
GLUCOSE P FAST SERPL-MCNC: 95 MG/DL (ref 65–99)
MAGNESIUM SERPL-MCNC: 2.4 MG/DL (ref 1.6–2.6)
POTASSIUM SERPL-SCNC: 3.6 MMOL/L (ref 3.5–5.3)
PROT SERPL-MCNC: 7.5 G/DL (ref 6.4–8.2)
SODIUM SERPL-SCNC: 139 MMOL/L (ref 136–145)

## 2021-09-03 PROCEDURE — 80053 COMPREHEN METABOLIC PANEL: CPT

## 2021-09-03 PROCEDURE — 83735 ASSAY OF MAGNESIUM: CPT

## 2021-09-03 PROCEDURE — 36415 COLL VENOUS BLD VENIPUNCTURE: CPT

## 2021-09-14 ENCOUNTER — OFFICE VISIT (OUTPATIENT)
Dept: BARIATRICS | Facility: CLINIC | Age: 46
End: 2021-09-14

## 2021-09-14 VITALS
DIASTOLIC BLOOD PRESSURE: 80 MMHG | SYSTOLIC BLOOD PRESSURE: 130 MMHG | WEIGHT: 211.4 LBS | HEIGHT: 65 IN | BODY MASS INDEX: 35.22 KG/M2

## 2021-09-14 DIAGNOSIS — R63.5 ABNORMAL WEIGHT GAIN: ICD-10-CM

## 2021-09-14 PROCEDURE — VLCD

## 2021-09-14 PROCEDURE — RECHECK

## 2021-09-14 NOTE — PROGRESS NOTES
Weight Management Medical Nutrition Assessment  Kuldip Givens was here today for her 4 week VLCD follow-up  Today she weighs 211 4 lbs giving her a loss of 5 8 lbs in the last 2 weeks  She continues to tolerate product with no difficulty but is having a little constipation at this time and has starting to take fiber gummies  She has and egg or string cheese on her busier days if needed  She is tracking her water and has been getting 80 -90 oz daily  Labs reviewed and WNL per Dr Asa Cotto  She will continue at this time       Patient seen by Medical Provider in past 6 months:  yes  Requested to schedule appointment with Medical Provider: No        Anthropometric Measurements  Start Weight (#): 227 2  Current Weight (#): 211 4   TBW % Change from start weight:7%  Ideal Body Weight (#):125  Goal Weight (#):175     Weight Loss History  Previous weight loss attempts: Commercial Programs ([employer], Raffy Rodriguez, etc )     Food and Nutrition Related History  Wake up: 6 am              Bed Time: 10 - 11     Food Recall  Breakfast:meal replacement                 Snack:  Lunch:meal replacement  Snack:bar  Dinner:meal replacement  Snack:        Beverages: water  Volume of beverage intake: 80     Weekends: Same  Cravings: sweets  Trouble area of day:afternoon     Frequency of Eating out: irregularly  Food restrictions:none  Cooking: self   Food Shopping: self     Physical Activity Intake  Activity:none currently  Frequency:rarely  Physical limitations/barriers to exercise: none     Estimated Needs  Energy     [employer] Needs: BMR : 1600   1-2# loss weekly sedentary:  920 - 1420           1-2# loss weekly lightly active:1200 - 1700  Maintenance calories for sedentary activity level: 1920  Protein:68 - 85      (1 2-1 5g/kg IBW)  Fluid: 66    (35mL/kg IBW)     Nutrition Diagnosis  Yes;     Overweight/obesity  related to Excess energy intake as evidenced by  BMI more than normative standard for age and sex (obesity-grade II 35-39  9)     Nutrition Intervention     Nutrition Prescription  Calories:760  Protein:96  Fluid:80     Meal Plan (Ron/Pro/Carb)  Breakfast: 200/27/10  Snack:  Lunch:200/27/10  Snack:160/15/12  Dinner:200/27/10  Snack:     Nutrition Education:    Calorie controlled menu  Lean protein food choices  Healthy snack options  Food journaling tips        Nutrition Counseling:  Strategies: meal planning, portion sizes, healthy snack choices, hydration, fiber intake, protein intake, exercise, food journal        Monitoring and Evaluation:  Evaluation criteria:  Energy Intake  Meet protein needs  Maintain adequate hydration  Monitor weekly weight  Meal planning/preparation  Food journal   Decreased portions at mealtimes and snacks  Physical activity      Barriers to learning:none  Readiness to change: Action:  (Changing behavior)  Comprehension: good  Expected Compliance: good

## 2021-09-28 ENCOUNTER — OFFICE VISIT (OUTPATIENT)
Dept: BARIATRICS | Facility: CLINIC | Age: 46
End: 2021-09-28

## 2021-09-28 VITALS — HEIGHT: 65 IN | BODY MASS INDEX: 34.26 KG/M2 | WEIGHT: 205.6 LBS

## 2021-09-28 DIAGNOSIS — R63.5 ABNORMAL WEIGHT GAIN: Primary | ICD-10-CM

## 2021-09-28 PROCEDURE — VLCD

## 2021-09-28 PROCEDURE — RECHECK

## 2021-09-28 NOTE — PROGRESS NOTES
Weight Management Medical Nutrition Assessment  Everardo Marin was here today for her 6 week VLCD follow-up   Today she weighs 205 6 lbs giving her a loss of 5 8 lbs in the last 2 weeks   She continues to tolerate product with no difficulty but is having a little constipation at this time  She has been taking fiber gummies, but reports that "did not help that much "  I recommended trying colace, and she also purchased product with fiber in it as well   She has and egg or string cheese on her busier days if needed  Yolyla Jae is tracking her water and has been getting 80 -90 oz daily   Labs ordered and BP taken   Dulce Maria Rowe will continue at this time       Patient seen by Medical Provider in past 6 months:  yes  Requested to schedule appointment with Medical Provider: No        Anthropometric Measurements  Start Weight (#): 227 2  Current Weight (#): 205 6   TBW % Change from start weight:10%  Ideal Body Weight (#):125  Goal Weight (#):175     Weight Loss History  Previous weight loss attempts: Commercial Programs (Weight Watchers, Magen Chery, etc )     Food and Nutrition Related History  Wake up: 6 am              Bed Time: 10 - 11     Food Recall  Breakfast:meal replacement                 Snack:  Lunch:meal replacement  Snack:bar  Dinner:meal replacement  Snack:        Beverages: water  Volume of beverage intake: 80     Weekends: Same  Cravings: sweets  Trouble area of day:afternoon     Frequency of Eating out: irregularly  Food restrictions:none  Cooking: self   Food Shopping: self     Physical Activity Intake  Activity:none currently  Frequency:rarely  Physical limitations/barriers to exercise: none     Estimated Needs  Energy     [employer] Needs:  BMR : 3639   2-2# loss weekly sedentary:  933 - 1388           1-2# loss weekly lightly active:1164 - 1664  Maintenance calories for sedentary activity level: 1888  Protein:68 - 85      (1 2-1 5g/kg IBW)  Fluid: 66    (35mL/kg IBW)     Nutrition Diagnosis  Yes;    Overweight/obesity  related to Excess energy intake as evidenced by  BMI more than normative standard for age and sex (obesity-grade II 35-39  9)     Nutrition Intervention     Nutrition Prescription  Calories:760  Protein:96  Fluid:80     Meal Plan (Ron/Pro/Carb)  Breakfast: 200/27/10  Snack:  Lunch:200/27/10  Snack:160/15/12  Dinner:200/27/10  Snack:     Nutrition Education:    Calorie controlled menu  Lean protein food choices  Healthy snack options  Food journaling tips        Nutrition Counseling:  Strategies: meal planning, portion sizes, healthy snack choices, hydration, fiber intake, protein intake, exercise, food journal        Monitoring and Evaluation:  Evaluation criteria:  Energy Intake  Meet protein needs  Maintain adequate hydration  Monitor weekly weight  Meal planning/preparation  Food journal   Decreased portions at mealtimes and snacks  Physical activity      Barriers to learning:none  Readiness to change: Action:  (Changing behavior)  Comprehension: good  Expected Compliance: good

## 2021-10-09 ENCOUNTER — APPOINTMENT (OUTPATIENT)
Dept: LAB | Facility: CLINIC | Age: 46
End: 2021-10-09
Payer: COMMERCIAL

## 2021-10-09 DIAGNOSIS — R63.5 ABNORMAL WEIGHT GAIN: ICD-10-CM

## 2021-10-09 LAB
ALBUMIN SERPL BCP-MCNC: 4.1 G/DL (ref 3.5–5)
ALP SERPL-CCNC: 62 U/L (ref 46–116)
ALT SERPL W P-5'-P-CCNC: 71 U/L (ref 12–78)
ANION GAP SERPL CALCULATED.3IONS-SCNC: 3 MMOL/L (ref 4–13)
AST SERPL W P-5'-P-CCNC: 29 U/L (ref 5–45)
BILIRUB SERPL-MCNC: 0.68 MG/DL (ref 0.2–1)
BUN SERPL-MCNC: 14 MG/DL (ref 5–25)
CALCIUM SERPL-MCNC: 9.6 MG/DL (ref 8.3–10.1)
CHLORIDE SERPL-SCNC: 103 MMOL/L (ref 100–108)
CO2 SERPL-SCNC: 32 MMOL/L (ref 21–32)
CREAT SERPL-MCNC: 0.92 MG/DL (ref 0.6–1.3)
GFR SERPL CREATININE-BSD FRML MDRD: 75 ML/MIN/1.73SQ M
GLUCOSE SERPL-MCNC: 119 MG/DL (ref 65–140)
MAGNESIUM SERPL-MCNC: 2.3 MG/DL (ref 1.6–2.6)
POTASSIUM SERPL-SCNC: 3.4 MMOL/L (ref 3.5–5.3)
PROT SERPL-MCNC: 7.4 G/DL (ref 6.4–8.2)
SODIUM SERPL-SCNC: 138 MMOL/L (ref 136–145)

## 2021-10-09 PROCEDURE — 83735 ASSAY OF MAGNESIUM: CPT

## 2021-10-09 PROCEDURE — 80053 COMPREHEN METABOLIC PANEL: CPT

## 2021-10-09 PROCEDURE — 36415 COLL VENOUS BLD VENIPUNCTURE: CPT

## 2021-10-12 ENCOUNTER — OFFICE VISIT (OUTPATIENT)
Dept: BARIATRICS | Facility: CLINIC | Age: 46
End: 2021-10-12

## 2021-10-12 VITALS
SYSTOLIC BLOOD PRESSURE: 126 MMHG | BODY MASS INDEX: 33.52 KG/M2 | DIASTOLIC BLOOD PRESSURE: 76 MMHG | WEIGHT: 201.2 LBS | HEIGHT: 65 IN

## 2021-10-12 DIAGNOSIS — R63.5 ABNORMAL WEIGHT GAIN: ICD-10-CM

## 2021-10-12 PROCEDURE — RECHECK

## 2021-10-12 PROCEDURE — VLCD

## 2021-10-26 ENCOUNTER — OFFICE VISIT (OUTPATIENT)
Dept: BARIATRICS | Facility: CLINIC | Age: 46
End: 2021-10-26

## 2021-10-26 VITALS — WEIGHT: 195.8 LBS | BODY MASS INDEX: 32.62 KG/M2 | HEIGHT: 65 IN

## 2021-10-26 DIAGNOSIS — R63.5 ABNORMAL WEIGHT GAIN: ICD-10-CM

## 2021-10-26 PROCEDURE — VLCD

## 2021-10-26 PROCEDURE — RECHECK

## 2021-12-15 ENCOUNTER — OFFICE VISIT (OUTPATIENT)
Dept: BARIATRICS | Facility: CLINIC | Age: 46
End: 2021-12-15

## 2021-12-15 VITALS — BODY MASS INDEX: 32.65 KG/M2 | HEIGHT: 65 IN | WEIGHT: 196 LBS

## 2021-12-15 DIAGNOSIS — R63.5 ABNORMAL WEIGHT GAIN: ICD-10-CM

## 2021-12-15 PROCEDURE — DB3PK

## 2021-12-15 PROCEDURE — RECHECK

## 2022-01-11 ENCOUNTER — OFFICE VISIT (OUTPATIENT)
Dept: BARIATRICS | Facility: CLINIC | Age: 47
End: 2022-01-11

## 2022-01-11 VITALS — HEIGHT: 65 IN | WEIGHT: 198 LBS | BODY MASS INDEX: 32.99 KG/M2

## 2022-01-11 DIAGNOSIS — R63.5 ABNORMAL WEIGHT GAIN: Primary | ICD-10-CM

## 2022-01-11 PROCEDURE — RECHECK

## 2022-01-11 NOTE — PROGRESS NOTES
Weight Management Medical Nutrition Assessment  Mingo Eng was here today for 2/3 RD visits,  Today she weighs 198 0  lbs giving her a gain of 2 lbs in the last 4 weeks  She was "relieved that it was only 2 lbs" since it was over the holidays  She admits that she did indulge but did so mindfully and was aware of portions  She also practiced mindful eating and was meeting her hydration goals daily  She is not currently food logging and I recommend that she start again  She is also going to start following a partial meal plan again  (2 meal replacements, 1 bar, a low carb snack and 1 sensible low carb meal ) She continues to walk 8 -10,000 steps daily and plans to increase her exercise     Patient seen by Medical Provider in past 6 months:  yes  Requested to schedule appointment with Medical Provider: No        Anthropometric Measurements  Start Weight (#): 227 2  Current Weight (#): 198 0  TBW % Change from start weight:13%  Ideal Body Weight (#):125  Goal Weight (#):175     Weight Loss History  Previous weight loss attempts: Commercial Programs (Weight Watchers, Pool Cunningham, etc )     Food and Nutrition Related History  Wake up: 6 am              Bed Time: 10 - 11     Food Recall  Breakfast:bar                Snack:cheese stick  Lunch:meal replacement  Snack:  Dinner:chicken with vegetables and potatoes (1/4 cup)  Snack:        Beverages: water  Volume of beverage intake: 60 - 80     Weekends: Same  Cravings: sweets  Trouble area of day:afternoon     Frequency of Eating out: irregularly  Food restrictions:none  Cooking: self   Food Shopping: self     Physical Activity Intake  Activity:none currently  Frequency:rarely  Physical limitations/barriers to exercise: none     Estimated Needs  Energy     [employer] Needs:  BMR : 3969   3-6# loss weekly sedentary:  835 - 1335          1-2# loss weekly lightly active:1102 - 1602  Maintenance calories for sedentary activity level: 1835  Protein:68 - 85      (1 2-1 5g/kg IBW)  Fluid: 66    (35mL/kg IBW)     Nutrition Diagnosis  Yes;    Overweight/obesity  related to Excess energy intake as evidenced by  BMI more than normative standard for age and sex (obesity-grade II 35-39  9)     Nutrition Intervention     Nutrition Prescription  Calories:1000 - 1100  Protein:68 - 80  Fluid:80     Meal Plan (Ron/Pro/Carb)  Breakfast: 200/27/10  Snack:  Lunch:200/27/10  Snack:160/15/12  Dinner:440/35/-  Snack:     Nutrition Education:    Calorie controlled menu  Lean protein food choices  Healthy snack options  Food journaling tips        Nutrition Counseling:  Strategies: meal planning, portion sizes, healthy snack choices, hydration, fiber intake, protein intake, exercise, food journal        Monitoring and Evaluation:  Evaluation criteria:  Energy Intake  Meet protein needs  Maintain adequate hydration  Monitor weekly weight  Meal planning/preparation  Food journal   Decreased portions at mealtimes and snacks  Physical activity      Barriers to learning:none  Readiness to change: Action:  (Changing behavior)  Comprehension: good  Expected Compliance: good

## 2022-02-16 ENCOUNTER — OFFICE VISIT (OUTPATIENT)
Dept: BARIATRICS | Facility: CLINIC | Age: 47
End: 2022-02-16

## 2022-02-16 VITALS — BODY MASS INDEX: 34.12 KG/M2 | HEIGHT: 65 IN | WEIGHT: 204.8 LBS

## 2022-02-16 DIAGNOSIS — R63.5 ABNORMAL WEIGHT GAIN: Primary | ICD-10-CM

## 2022-02-16 PROCEDURE — RECHECK

## 2022-02-16 NOTE — PROGRESS NOTES
Weight Management Medical Nutrition Assessment  Mickey Glez was here today for 2/3 RD visits,  Today she weighs 198 0  lbs giving her a gain of 2 lbs in the last 4 weeks  She was "relieved that it was only 2 lbs" since it was over the holidays  She admits that she did indulge but did so mindfully and was aware of portions  She also practiced mindful eating and was meeting her hydration goals daily  She is not currently food logging and I recommend that she start again  She is also going to start following a partial meal plan again  (2 meal replacements, 1 bar, a low carb snack and 1 sensible low carb meal ) She continues to walk 8 -10,000 steps daily and plans to increase her exercise  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    Vahid [place] stressful  Eating more peanuts  not drinking enough water  Patient seen by Medical Provider in past 6 months:  yes  Requested to schedule appointment with Medical Provider: No        Anthropometric Measurements  Start Weight (#): 227 2  Current Weight (#): 198 0  TBW % Change from start weight:13%  Ideal Body Weight (#):125  Goal Weight (#):175     Weight Loss History  Previous weight loss attempts: Commercial Programs (Weight Watchers, Candace Karnes, etc )     Food and Nutrition Related History  Wake up: 6 am              Bed Time: 10 - 11     Food Recall  Breakfast:bar                Snack:cheese stick  Lunch:meal replacement  Snack:  Dinner:chicken with vegetables and potatoes (1/4 cup)  Snack:        Beverages: water  Volume of beverage intake: 60 - 80     Weekends: Same  Cravings: sweets  Trouble area of day:afternoon     Frequency of Eating out: irregularly  Food restrictions:none  Cooking: self   Food Shopping: self     Physical Activity Intake  Activity:none currently  Frequency:rarely  Physical limitations/barriers to exercise: none     Estimated Needs  Energy     [employer] Needs:  BMR : 6939   5-3# loss weekly sedentary:  835 - 1335          1-2# loss weekly lightly active:1102 - 1602  Maintenance calories for sedentary activity level: 1835  Protein:68 - 85      (1 2-1 5g/kg IBW)  Fluid: 66    (35mL/kg IBW)     Nutrition Diagnosis  Yes;    Overweight/obesity  related to Excess energy intake as evidenced by  BMI more than normative standard for age and sex (obesity-grade II 35-39  9)     Nutrition Intervention     Nutrition Prescription  Calories:1000 - 1100  Protein:68 - 80  Fluid:80     Meal Plan (Ron/Pro/Carb)  Breakfast: 200/27/10  Snack:  Lunch:200/27/10  Snack:160/15/12  Dinner:440/35/-  Snack:     Nutrition Education:    Calorie controlled menu  Lean protein food choices  Healthy snack options  Food journaling tips        Nutrition Counseling:  Strategies: meal planning, portion sizes, healthy snack choices, hydration, fiber intake, protein intake, exercise, food journal        Monitoring and Evaluation:  Evaluation criteria:  Energy Intake  Meet protein needs  Maintain adequate hydration  Monitor weekly weight  Meal planning/preparation  Food journal   Decreased portions at mealtimes and snacks  Physical activity      Barriers to learning:none  Readiness to change: Action:  (Changing behavior)  Comprehension: good  Expected Compliance: good

## 2022-03-09 ENCOUNTER — OFFICE VISIT (OUTPATIENT)
Dept: BARIATRICS | Facility: CLINIC | Age: 47
End: 2022-03-09

## 2022-03-09 VITALS — WEIGHT: 201.4 LBS | HEIGHT: 65 IN | BODY MASS INDEX: 33.55 KG/M2

## 2022-03-09 DIAGNOSIS — R63.5 ABNORMAL WEIGHT GAIN: ICD-10-CM

## 2022-03-09 PROCEDURE — RECHECK

## 2022-03-09 PROCEDURE — DB3PK

## 2022-03-09 NOTE — PROGRESS NOTES
Weight Management Medical Nutrition Assessment  Rock Sullivan was here today for 1/3 RD visits,  Today she weighs 201 4  lbs giving her a loss of 3 4 lbs in the last 4 weeks   She continues to follow a partial meal plan having 2 meal replacements, a bar, and 1 low carb sensible meal   She admits that sometimes she catches herself grazing on "whatever is there" even when she is not hungry  She plans to start food logging again, and limiting how much she is mindlessly snacking  She getting in approx 10,000 steps daily at her job but does not have much time to get in additional exercise  She is working full time, 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, and has 3 children  Once the weather is nicer, and her project is complete with be adding exercise in more consistently  Requested to schedule appointment with Medical Provider: No        Anthropometric Measurements  Start Weight (#): 227 2  Current Weight (#): 201 4  TBW % Change from start weight:13%  Ideal Body Weight (#):125  Goal Weight (#):175     Weight Loss History  Previous weight loss attempts: Commercial Programs (Weight Watchers, Peter Aguilar, etc )     Food and Nutrition Related History  Wake up: 6 am              Bed Time: 10 - 11     Food Recall  Breakfast:bar                Snack:cheese stick  Lunch:meal replacement  Snack:  Dinner:  Grilled chicken salad  Snack:        Beverages: water  Volume of beverage intake: 60 - 80     Weekends: Same  Cravings: sweets  Trouble area of day:afternoon     Frequency of Eating out: irregularly  Food restrictions:none  Cooking: self   Food Shopping: self     Physical Activity Intake  Activity:none currently  Frequency:rarely  Physical limitations/barriers to exercise: none     Estimated Needs  Energy     [employer] Needs:  BMR : 5745   4-5# loss weekly sedentary:  835 - 1335          1-2# loss weekly lightly active:1102 - 1602  Maintenance calories for sedentary activity level: 1835  Protein:68 - 85      (1 2-1 5g/kg IBW)  Fluid: 66    (35mL/kg IBW)     Nutrition Diagnosis  Yes;    Overweight/obesity  related to Excess energy intake as evidenced by  BMI more than normative standard for age and sex (obesity-grade II 35-39  9)     Nutrition Intervention     Nutrition Prescription  Calories:1000 - 1100  Protein:68 - 80  Fluid:80     Meal Plan (Ron/Pro/Carb)  Breakfast: 200/27/10  Snack:  Lunch:200/27/10  Snack:160/15/12  Dinner:440/35/-  Snack:     Nutrition Education:    Calorie controlled menu  Lean protein food choices  Healthy snack options  Food journaling tips        Nutrition Counseling:  Strategies: meal planning, portion sizes, healthy snack choices, hydration, fiber intake, protein intake, exercise, food journal        Monitoring and Evaluation:  Evaluation criteria:  Energy Intake  Meet protein needs  Maintain adequate hydration  Monitor weekly weight  Meal planning/preparation  Food journal   Decreased portions at mealtimes and snacks  Physical activity      Barriers to learning:none  Readiness to change: Action:  (Changing behavior)  Comprehension: good  Expected Compliance: good

## 2022-05-04 ENCOUNTER — OFFICE VISIT (OUTPATIENT)
Dept: BARIATRICS | Facility: CLINIC | Age: 47
End: 2022-05-04

## 2022-05-04 VITALS — HEIGHT: 65 IN | BODY MASS INDEX: 34.49 KG/M2 | WEIGHT: 207 LBS

## 2022-05-04 DIAGNOSIS — R63.5 ABNORMAL WEIGHT GAIN: Primary | ICD-10-CM

## 2022-05-04 PROCEDURE — RECHECK

## 2022-05-04 NOTE — PROGRESS NOTES
Weight Management Medical Nutrition Assessment  Elijah Hickman was here today for 2/3 RD visits,  Today she weighs 207  lbs giving her a gain of 3 4 lbs in the last 4 weeks   She continues to follow a partial meal plan having 2 meal replacements, a bar, and 1 low carb sensible meal   She admits that sometimes she catches herself grazing on "whatever is there" even when she is not hungry  She plans to start food logging again, and limiting how much she is mindlessly snacking  She getting in approx 10,000 steps daily at her job but does not have much time to get in additional exercise  She is working full time, 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, and has 3 children  Once the weather is nicer, and her project is complete with be adding exercise in more consistently  Nibbling and grazing  Using food as a reward  Goal is to food logging for the next 2 weeks      Requested to schedule appointment with Medical Provider: No        Anthropometric Measurements  Start Weight (#): 227 2  Current Weight (#): 201 4  TBW % Change from start weight:13%  Ideal Body Weight (#):125  Goal Weight (#):175     Weight Loss History  Previous weight loss attempts: Commercial Programs (Weight Watchers, Carnella Barge, etc )     Food and Nutrition Related History  Wake up: 6 am              Bed Time: 10 - 11     Food Recall  Breakfast:bar                Snack:cheese stick  Lunch:meal replacement  Snack:  Dinner:  Grilled chicken salad  Snack:        Beverages: water  Volume of beverage intake: 60 - 80     Weekends: Same  Cravings: sweets  Trouble area of day:afternoon     Frequency of Eating out: irregularly  Food restrictions:none  Cooking: self   Food Shopping: self     Physical Activity Intake  Activity:gardening and trying to walk when there time  (44551 steps daily)  Frequency:rarely  Physical limitations/barriers to exercise: none     Estimated Needs  Energy     [address]  [facility] Energy Needs:  BMR : 0157   2-3# loss weekly sedentary:  884 - 6861          1-2# loss weekly lightly active:1102 - 1602  Maintenance calories for sedentary activity level: 1835  Protein:68 - 85      (1 2-1 5g/kg IBW)  Fluid: 66    (35mL/kg IBW)     Nutrition Diagnosis  Yes;    Overweight/obesity  related to Excess energy intake as evidenced by  BMI more than normative standard for age and sex (obesity-grade II 35-39  9)     Nutrition Intervention     Nutrition Prescription  Calories:1000 - 1100  Protein:68 - 80  Fluid:80     Meal Plan (Ron/Pro/Carb)  Breakfast: 200/27/10  Snack:  Lunch:200/27/10  Snack:160/15/12  Dinner:440/35/-  Snack:     Nutrition Education:    Calorie controlled menu  Lean protein food choices  Healthy snack options  Food journaling tips        Nutrition Counseling:  Strategies: meal planning, portion sizes, healthy snack choices, hydration, fiber intake, protein intake, exercise, food journal        Monitoring and Evaluation:  Evaluation criteria:  Energy Intake  Meet protein needs  Maintain adequate hydration  Monitor weekly weight  Meal planning/preparation  Food journal   Decreased portions at mealtimes and snacks  Physical activity      Barriers to learning:none  Readiness to change: Action:  (Changing behavior)  Comprehension: good  Expected Compliance: good

## 2022-06-01 ENCOUNTER — OFFICE VISIT (OUTPATIENT)
Dept: BARIATRICS | Facility: CLINIC | Age: 47
End: 2022-06-01

## 2022-06-01 VITALS — HEIGHT: 65 IN | WEIGHT: 202.6 LBS | BODY MASS INDEX: 33.76 KG/M2

## 2022-06-01 DIAGNOSIS — R63.5 ABNORMAL WEIGHT GAIN: Primary | ICD-10-CM

## 2022-06-01 PROCEDURE — RECHECK

## 2022-06-01 NOTE — PROGRESS NOTES
Weight Management Medical Nutrition Assessment  Yfn Arellano was here today for 3/3 RD visits,  Today she weighs 202 6  lbs giving her a loss of 4 4 lbs in the last 5 weeks   She continues to follow a partial meal plan having 2 meal replacements, a bar, and 1 low carb sensible meal   She has been food logging again, and limiting how much she is mindlessly snacking   She getting in approx 10,000 steps daily at her job but does not have much time to get in additional exercise  She has been starting to walk occasionally as time allows  Overall, she is making healthier food choices and not mindlessly eating as much      Requested to schedule appointment with Medical Provider: No        Anthropometric Measurements  Start Weight (#): 227 2  Current Weight (#): 202 6  TBW % Change from start weight:13%  Ideal Body Weight (#):125  Goal Weight (#):175     Weight Loss History  Previous weight loss attempts: Commercial Programs (Weight Watchers, Tonya Herbert, etc )     Food and Nutrition Related History  Wake up: 6 am              Bed Time: 10 - 11     Food Recall  Breakfast:bar                Snack:cheese stick  Lunch:meal replacement  Snack:  Dinner:  Grilled chicken salad  Snack:        Beverages: water  Volume of beverage intake: 60 - 80     Weekends: Same  Cravings: sweets  Trouble area of day:afternoon     Frequency of Eating out: irregularly  Food restrictions:none  Cooking: self   Food Shopping: self     Physical Activity Intake  Activity:gardening and trying to walk when there time  (44116 steps daily)  Frequency:rarely  Physical limitations/barriers to exercise: none     Estimated Needs  Energy     [address]  [facility] Energy Needs:  BMR : 9976   5# loss weekly sedentary: 1366          1-2# loss weekly lightly active:1102 - 1602  Maintenance calories for sedentary activity level: 1866  Protein:68 - 85      (1 2-1 5g/kg IBW)  Fluid: 66    (35mL/kg IBW)     Nutrition Diagnosis  Yes;    Overweight/obesity  related to Excess energy intake as evidenced by  BMI more than normative standard for age and sex (obesity-grade II 35-39  9)     Nutrition Intervention     Nutrition Prescription  Calories:1000 - 1100  Protein:68 - 80  Fluid:80     Meal Plan (Ron/Pro/Carb)  Breakfast: 200/27/10  Snack:  Lunch:200/27/10  Snack:160/15/12  Dinner:440/35/-  Snack:     Nutrition Education:    Calorie controlled menu  Lean protein food choices  Healthy snack options  Food journaling tips        Nutrition Counseling:  Strategies: meal planning, portion sizes, healthy snack choices, hydration, fiber intake, protein intake, exercise, food journal        Monitoring and Evaluation:  Evaluation criteria:  Energy Intake  Meet protein needs  Maintain adequate hydration  Monitor weekly weight  Meal planning/preparation  Food journal   Decreased portions at mealtimes and snacks  Physical activity      Barriers to learning:none  Readiness to change: Action:  (Changing behavior)  Comprehension: good  Expected Compliance: good

## 2022-07-21 ENCOUNTER — OFFICE VISIT (OUTPATIENT)
Dept: BARIATRICS | Facility: CLINIC | Age: 47
End: 2022-07-21
Payer: COMMERCIAL

## 2022-07-21 VITALS
SYSTOLIC BLOOD PRESSURE: 140 MMHG | WEIGHT: 214.7 LBS | HEART RATE: 85 BPM | RESPIRATION RATE: 18 BRPM | HEIGHT: 65 IN | DIASTOLIC BLOOD PRESSURE: 88 MMHG | BODY MASS INDEX: 35.77 KG/M2

## 2022-07-21 DIAGNOSIS — R63.5 ABNORMAL WEIGHT GAIN: ICD-10-CM

## 2022-07-21 DIAGNOSIS — I10 ESSENTIAL HYPERTENSION: ICD-10-CM

## 2022-07-21 DIAGNOSIS — E78.2 MIXED HYPERLIPIDEMIA: ICD-10-CM

## 2022-07-21 DIAGNOSIS — E66.01 SEVERE OBESITY (BMI 35.0-39.9) WITH COMORBIDITY (HCC): Primary | ICD-10-CM

## 2022-07-21 PROCEDURE — 99214 OFFICE O/P EST MOD 30 MIN: CPT | Performed by: INTERNAL MEDICINE

## 2022-07-21 NOTE — PROGRESS NOTES
Assessment/Plan:  Ada Domingo was seen today for follow-up  Diagnoses and all orders for this visit:    Mixed hyperlipidemia  Essential hypertension  Severe obesity (BMI 35 0-39  9) with comorbidity (HCC)  -     Magnesium; Future  -     Comprehensive metabolic panel; Future    Abnormal weight gain  -     Magnesium; Future  -     Comprehensive metabolic panel; Future    Discussed the importance of behavioral modifications rather than focusing on calorie restrictions alone  Is important that she realizes VLCD is a temporary weight loss management and she cannot not be relying on this method for years  Patient expressed her understanding and willing to work on her behavior modifications more so even after she is done with the VLCD  Consider trying Saxenda or Wegovy at a slower titration schedule as patient did not tolerate Saxenda due to nausea  Consider wellbutrin as well if BP better   Labs ordered: cmp, mg  HTN meds addressed: continue amlodipine-valsartan  DM2 meds addressed: N/A  VLCD time restriction based on BMI: No  EKG: reviewed, last EKG 8/2021 WNL  Contraceptive method: Yes  Pregnancy test required: No  Discussed with patient alcohol is not permitted while on VLCD  Total time spent: 30 minutes with >50% face-to-face time with the patient  Follow up in approximately 3 months with Non-Surgical Physician/Advanced Practitioner  Subjective:   Chief Complaint   Patient presents with    Follow-up     Interested in getting back into VLCD        Patient ID  HPI:  Jesus Ferrell  is a 52 y o  female with excess weight/obesity here to pursue weight management  Patient is pursuing [facility] Program  Did VLCD for 12 weeks last year, was down to 196 lbs but gained some weight back  Did partial meal replacement for awhile and then fell off track  Most recent notes and records were reviewed      Wt Readings from Last 10 Encounters:   07/21/22 97 4 kg (214 lb 11 2 oz)   06/01/22 91 9 kg (202 lb 9 6 oz)   05/04/22 93 9 kg (207 lb)   03/09/22 91 4 kg (201 lb 6 4 oz)   02/16/22 92 9 kg (204 lb 12 8 oz)   01/11/22 89 8 kg (198 lb)   12/15/21 88 9 kg (196 lb)   10/26/21 88 8 kg (195 lb 12 8 oz)   10/12/21 91 3 kg (201 lb 3 2 oz)   09/28/21 93 3 kg (205 lb 9 6 oz)     Initial weight: 226 lbs (8/2021)  Current weight: 214 lbs  Change in weight: -12 lbs   Previously tried [place] (had nausea even at lower doses), Topamax (depth perception issues), Contrave (-20 lbs but SE with anxiety/nausea/vertigo), Orlistat (no success)  Finding that meal replacements are convenient and wants to try VLCD again   Realizes behavioral changes and dietary changes are both important  Has a full time job, 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   Drinks-  59 oz   Alcohol- no   Exercise- gardening, swimming      The following portions of the patient's history were reviewed and updated as appropriate: allergies, current medications, past family history, past medical history, past social history, past surgical history, and problem list     Review of Systems   Respiratory: Negative  Cardiovascular: Negative  Objective:  /88 (BP Location: Left arm, Patient Position: Sitting, Cuff Size: Large)   Pulse 85   Resp 18   Ht 5' 5" (1 651 m)   Wt 97 4 kg (214 lb 11 2 oz)   BMI 35 73 kg/m²   Constitutional: Well-developed, well-nourished and obese Body mass index is 35 73 kg/m²  Martell Organ HEENT: No conjunctival pallor or jaundice  Pulmonary: No increased work of breathing or signs of respiratory distress  CV: Normal rate, well-perfused  GI: Obese  Non-distended  MSK: No edema   Neuro: Oriented to person, place and time  Normal Speech  Normal gait  Psych: Normal affect and mood       Labs and Imaging  Most recent labs and imaging reviewed

## 2022-08-01 ENCOUNTER — APPOINTMENT (OUTPATIENT)
Dept: LAB | Facility: HOSPITAL | Age: 47
End: 2022-08-01
Payer: COMMERCIAL

## 2022-08-01 DIAGNOSIS — R63.5 ABNORMAL WEIGHT GAIN: ICD-10-CM

## 2022-08-01 DIAGNOSIS — E66.01 SEVERE OBESITY (BMI 35.0-39.9) WITH COMORBIDITY (HCC): ICD-10-CM

## 2022-08-01 LAB
ALBUMIN SERPL BCP-MCNC: 4.2 G/DL (ref 3.5–5)
ALP SERPL-CCNC: 55 U/L (ref 46–116)
ALT SERPL W P-5'-P-CCNC: 26 U/L (ref 12–78)
ANION GAP SERPL CALCULATED.3IONS-SCNC: 8 MMOL/L (ref 4–13)
AST SERPL W P-5'-P-CCNC: 15 U/L (ref 5–45)
BILIRUB SERPL-MCNC: 0.58 MG/DL (ref 0.2–1)
BUN SERPL-MCNC: 18 MG/DL (ref 5–25)
CALCIUM SERPL-MCNC: 8.8 MG/DL (ref 8.3–10.1)
CHLORIDE SERPL-SCNC: 103 MMOL/L (ref 96–108)
CO2 SERPL-SCNC: 28 MMOL/L (ref 21–32)
CREAT SERPL-MCNC: 0.87 MG/DL (ref 0.6–1.3)
GFR SERPL CREATININE-BSD FRML MDRD: 79 ML/MIN/1.73SQ M
GLUCOSE P FAST SERPL-MCNC: 92 MG/DL (ref 65–99)
MAGNESIUM SERPL-MCNC: 2.1 MG/DL (ref 1.6–2.6)
POTASSIUM SERPL-SCNC: 4 MMOL/L (ref 3.5–5.3)
PROT SERPL-MCNC: 7.7 G/DL (ref 6.4–8.4)
SODIUM SERPL-SCNC: 139 MMOL/L (ref 135–147)

## 2022-08-01 PROCEDURE — 36415 COLL VENOUS BLD VENIPUNCTURE: CPT

## 2022-08-01 PROCEDURE — 83735 ASSAY OF MAGNESIUM: CPT

## 2022-08-01 PROCEDURE — 80053 COMPREHEN METABOLIC PANEL: CPT

## 2022-08-02 ENCOUNTER — OFFICE VISIT (OUTPATIENT)
Dept: BARIATRICS | Facility: CLINIC | Age: 47
End: 2022-08-02

## 2022-08-02 VITALS — WEIGHT: 211.8 LBS | HEIGHT: 65 IN | BODY MASS INDEX: 35.29 KG/M2

## 2022-08-02 DIAGNOSIS — R63.5 ABNORMAL WEIGHT GAIN: ICD-10-CM

## 2022-08-02 PROCEDURE — RECHECK

## 2022-08-02 PROCEDURE — VLCD

## 2022-08-16 ENCOUNTER — OFFICE VISIT (OUTPATIENT)
Dept: BARIATRICS | Facility: CLINIC | Age: 47
End: 2022-08-16

## 2022-08-16 VITALS
DIASTOLIC BLOOD PRESSURE: 94 MMHG | BODY MASS INDEX: 34.99 KG/M2 | HEIGHT: 65 IN | SYSTOLIC BLOOD PRESSURE: 138 MMHG | WEIGHT: 210 LBS

## 2022-08-16 DIAGNOSIS — R63.5 ABNORMAL WEIGHT GAIN: ICD-10-CM

## 2022-08-16 PROCEDURE — VLCD

## 2022-08-16 PROCEDURE — RECHECK

## 2022-08-16 NOTE — PROGRESS NOTES
Weight Management Medical Nutrition Assessment  Loree Ivey was here today for her 2 week VLCD follow-up  Today she weighs 210 lbs giving her a loss of 1 9 lbs in the last 2 weeks  She admits that she has been taking "nibbles" of cheese, egg, turkey throughout the day  Reiterated the importance of following the plan exactly and to not add snacks in unless needed; She is also not drinking enough water, so discussed the importance of drinking at least 80 oz per day  I will allow her to continue at this time, but did explain that she may need to go back to a partial plan if she does not lose next time  BP taken        Requested to schedule appointment with Medical Provider: No        Anthropometric Measurements  Start Weight (#): 227 2  Current Weight (#): 210  TBW % Change from start weight:13%  Ideal Body Weight (#):125  Goal Weight (#):175     Weight Loss History  Previous weight loss attempts: Commercial Programs (Weight Watchers, Gusman Otto, etc )     Food and Nutrition Related History  Wake up: 6 am              Bed Time: 10 - 11     Food Recall  Breakfast:bar                Snack:cheese stick  Lunch:meal replacement  Snack:  Dinner:  Grilled chicken salad  Snack:        Beverages: water  Volume of beverage intake: 60 - 80     Weekends: Same  Cravings: sweets  Trouble area of day:afternoon     Frequency of Eating out: irregularly  Food restrictions:none  Cooking: self   Food Shopping: self     Physical Activity Intake  Activity:gardening and trying to walk when there time  (55862 steps daily)  Frequency:rarely  Physical limitations/barriers to exercise: none     Estimated Needs  Energy     [address]  [facility] Energy Needs:  BMR : 7959   6# loss weekly sedentary: 1366          1-2# loss weekly lightly active:1102 - 1602  Maintenance calories for sedentary activity level: 1866  Protein:68 - 85      (1 2-1 5g/kg IBW)  Fluid: 66    (35mL/kg IBW)     Nutrition Diagnosis  Yes;    Overweight/obesity  related to Excess energy intake as evidenced by  BMI more than normative standard for age and sex (obesity-grade II 35-39  9)     Nutrition Intervention     Nutrition Prescription  Calories:760  Protein:96  Fluid:80     Meal Plan (Ron/Pro/Carb)  Breakfast: 200/27/10  Snack:  Lunch:200/27/10  Snack:160/15/12  Dinner:200/27/10  Snack:     Nutrition Education:    Calorie controlled menu  Lean protein food choices  Healthy snack options  Food journaling tips        Nutrition Counseling:  Strategies: meal planning, portion sizes, healthy snack choices, hydration, fiber intake, protein intake, exercise, food journal        Monitoring and Evaluation:  Evaluation criteria:  Energy Intake  Meet protein needs  Maintain adequate hydration  Monitor weekly weight  Meal planning/preparation  Food journal   Decreased portions at mealtimes and snacks  Physical activity      Barriers to learning:none  Readiness to change: Action:  (Changing behavior)  Comprehension: good  Expected Compliance: good

## 2022-08-30 ENCOUNTER — OFFICE VISIT (OUTPATIENT)
Dept: BARIATRICS | Facility: CLINIC | Age: 47
End: 2022-08-30

## 2022-08-30 VITALS
HEIGHT: 65 IN | DIASTOLIC BLOOD PRESSURE: 80 MMHG | BODY MASS INDEX: 34.49 KG/M2 | SYSTOLIC BLOOD PRESSURE: 130 MMHG | WEIGHT: 207 LBS

## 2022-08-30 DIAGNOSIS — R63.5 ABNORMAL WEIGHT GAIN: Primary | ICD-10-CM

## 2022-08-30 PROCEDURE — VLCD

## 2022-08-30 PROCEDURE — RECHECK

## 2022-08-30 NOTE — PROGRESS NOTES
Weight Management Medical Nutrition Assessment  Larry Tello was here today for her 4 week VLCD follow-up  Today she weighs 207 lbs giving her a loss of 3 lbs in the last 2 weeks  She reports that she is not having a snack everyday, unless she walks and then will have a cheese stick or egg  She is still trying to increase her water  She has increased her steps daily and walked 3 times last week  She is still losing weight very slowly this time and she is frustrated with that  Reiterated the importance to following the plan exactly as the guidelines state  Also discussed possibly taking her off and following a partial meal plan  I will allow her to continue at this time  BP taken and labs ordered       Requested to schedule appointment with Medical Provider: No        Anthropometric Measurements  Start Weight (#): 227 2  Current Weight (#): 207  TBW % Change from start weight:13%  Ideal Body Weight (#):125  Goal Weight (#):175     Weight Loss History  Previous weight loss attempts: Commercial Programs (Weight Watchers, Goldy Manges, etc )     Food and Nutrition Related History  Wake up: 6 am              Bed Time: 10 - 11     Food Recall  Breakfast:bar                Snack:cheese stick  Lunch:meal replacement  Snack:  Dinner:  meal replacement  Snack:        Beverages: water  Volume of beverage intake: 60 - 80     Weekends: Same  Cravings: sweets  Trouble area of day:afternoon     Frequency of Eating out: irregularly  Food restrictions:none  Cooking: self   Food Shopping: self     Physical Activity Intake  Activity:gardening and trying to walk when there time  (97151 steps daily)  Frequency:rarely  Physical limitations/barriers to exercise: none     Estimated Needs  Energy     [address]  [facility] Energy Needs:  BMR : 1646   6# loss weekly sedentary: 1366          1-2# loss weekly lightly active:1102 - 1602  Maintenance calories for sedentary activity level: 1866  Protein:68 - 85      (1 2-1 5g/kg IBW)  Fluid: 66    (35mL/kg IBW)     Nutrition Diagnosis  Yes;    Overweight/obesity  related to Excess energy intake as evidenced by  BMI more than normative standard for age and sex (obesity-grade II 35-39  9)     Nutrition Intervention     Nutrition Prescription  Calories:760  Protein:96  Fluid:80     Meal Plan (Ron/Pro/Carb)  Breakfast: 200/27/10  Snack:  Lunch:200/27/10  Snack:160/15/12  Dinner:200/27/10  Snack:     Nutrition Education:    Calorie controlled menu  Lean protein food choices  Healthy snack options  Food journaling tips        Nutrition Counseling:  Strategies: meal planning, portion sizes, healthy snack choices, hydration, fiber intake, protein intake, exercise, food journal        Monitoring and Evaluation:  Evaluation criteria:  Energy Intake  Meet protein needs  Maintain adequate hydration  Monitor weekly weight  Meal planning/preparation  Food journal   Decreased portions at mealtimes and snacks  Physical activity      Barriers to learning:none  Readiness to change: Action:  (Changing behavior)  Comprehension: good  Expected Compliance: good

## 2022-09-02 ENCOUNTER — APPOINTMENT (OUTPATIENT)
Dept: LAB | Facility: CLINIC | Age: 47
End: 2022-09-02
Payer: COMMERCIAL

## 2022-09-02 DIAGNOSIS — R63.5 ABNORMAL WEIGHT GAIN: ICD-10-CM

## 2022-09-02 LAB
ALBUMIN SERPL BCP-MCNC: 4.1 G/DL (ref 3.5–5)
ALP SERPL-CCNC: 65 U/L (ref 46–116)
ALT SERPL W P-5'-P-CCNC: 67 U/L (ref 12–78)
ANION GAP SERPL CALCULATED.3IONS-SCNC: 6 MMOL/L (ref 4–13)
AST SERPL W P-5'-P-CCNC: 23 U/L (ref 5–45)
BILIRUB SERPL-MCNC: 0.37 MG/DL (ref 0.2–1)
BUN SERPL-MCNC: 22 MG/DL (ref 5–25)
CALCIUM SERPL-MCNC: 9.6 MG/DL (ref 8.3–10.1)
CHLORIDE SERPL-SCNC: 103 MMOL/L (ref 96–108)
CO2 SERPL-SCNC: 29 MMOL/L (ref 21–32)
CREAT SERPL-MCNC: 0.94 MG/DL (ref 0.6–1.3)
GFR SERPL CREATININE-BSD FRML MDRD: 72 ML/MIN/1.73SQ M
GLUCOSE SERPL-MCNC: 89 MG/DL (ref 65–140)
MAGNESIUM SERPL-MCNC: 2 MG/DL (ref 1.6–2.6)
POTASSIUM SERPL-SCNC: 3.3 MMOL/L (ref 3.5–5.3)
PROT SERPL-MCNC: 7.5 G/DL (ref 6.4–8.4)
SODIUM SERPL-SCNC: 138 MMOL/L (ref 135–147)

## 2022-09-02 PROCEDURE — 80053 COMPREHEN METABOLIC PANEL: CPT

## 2022-09-02 PROCEDURE — 36415 COLL VENOUS BLD VENIPUNCTURE: CPT

## 2022-09-02 PROCEDURE — 83735 ASSAY OF MAGNESIUM: CPT

## 2022-09-07 ENCOUNTER — TELEPHONE (OUTPATIENT)
Dept: BARIATRICS | Facility: CLINIC | Age: 47
End: 2022-09-07

## 2022-09-07 NOTE — TELEPHONE ENCOUNTER
Please call patient and tell her K is low and her PCP should prescribe a supplement for her  Thank you  Dr Zeinab Del Valle

## 2022-09-12 NOTE — PROGRESS NOTES
Weight Management Medical Nutrition Assessment  Kervin Vallejo was here today for her 6 week VLCD follow-up  Mayra Saba she weighs 204 lbs giving her a loss of 3 lbs in the last 2 weeks   She reports that she is not having a snack everyday, unless she walks and then will have a cheese stick or egg  She is still trying to increase her water  She has increased her steps daily and walked 3 times last week  She is still losing weight very slowly this time and she is frustrated with that  Reiterated the importance to following the plan exactly as the guidelines state  She reports that she did have an emergency meal once during the last 2 weeks  Discussed that she may need to transition to a partial meal plan  She asked what else she could do since it is not working as well for this time  I strongly suggest adding food into the plan and going to a partial   Will discuss next time  Requested to schedule appointment with Medical Provider: No        Anthropometric Measurements  Start Weight (#): 227 2  Current Weight (#): 204  TBW % Change from start weight:13%  Ideal Body Weight (#):125  Goal Weight (#):175     Weight Loss History  Previous weight loss attempts: Commercial Programs (Weight Watchers, Tonja Mini, etc )     Food and Nutrition Related History  Wake up: 6 am              Bed Time: 10 - 11     Food Recall  Breakfast:bar                Snack:cheese stick  Lunch:meal replacement  Snack:  Dinner:  meal replacement  Snack:        Beverages: water  Volume of beverage intake: 60 - 80     Weekends: Same  Cravings: sweets  Trouble area of day:afternoon     Frequency of Eating out: irregularly  Food restrictions:none  Cooking: self   Food Shopping: self     Physical Activity Intake  Activity:gardening and trying to walk when there time  (89577 steps daily)  Frequency:rarely  Physical limitations/barriers to exercise: none     Estimated Needs  Energy     [address]  [facility] Energy Needs:  BMR : 9453   1# loss weekly sedentary: 1366          1-2# loss weekly lightly active:1102 - 1602  Maintenance calories for sedentary activity level: 1866  Protein:68 - 85      (1 2-1 5g/kg IBW)  Fluid: 66    (35mL/kg IBW)     Nutrition Diagnosis  Yes;    Overweight/obesity  related to Excess energy intake as evidenced by  BMI more than normative standard for age and sex (obesity-grade II 35-39  9)     Nutrition Intervention     Nutrition Prescription  Calories:760  Protein:96  Fluid:80     Meal Plan (Ron/Pro/Carb)  Breakfast: 200/27/10  Snack:  Lunch:200/27/10  Snack:160/15/12  Dinner:200/27/10  Snack:     Nutrition Education:    Calorie controlled menu  Lean protein food choices  Healthy snack options  Food journaling tips        Nutrition Counseling:  Strategies: meal planning, portion sizes, healthy snack choices, hydration, fiber intake, protein intake, exercise, food journal        Monitoring and Evaluation:  Evaluation criteria:  Energy Intake  Meet protein needs  Maintain adequate hydration  Monitor weekly weight  Meal planning/preparation  Food journal   Decreased portions at mealtimes and snacks  Physical activity      Barriers to learning:none  Readiness to change: Action:  (Changing behavior)  Comprehension: good  Expected Compliance: good

## 2022-09-13 ENCOUNTER — OFFICE VISIT (OUTPATIENT)
Dept: BARIATRICS | Facility: CLINIC | Age: 47
End: 2022-09-13

## 2022-09-13 VITALS — BODY MASS INDEX: 34.12 KG/M2 | HEIGHT: 65 IN | WEIGHT: 204.8 LBS

## 2022-09-13 DIAGNOSIS — R63.5 ABNORMAL WEIGHT GAIN: ICD-10-CM

## 2022-09-13 PROCEDURE — VLCD

## 2022-09-13 PROCEDURE — RECHECK

## 2022-09-27 ENCOUNTER — OFFICE VISIT (OUTPATIENT)
Dept: BARIATRICS | Facility: CLINIC | Age: 47
End: 2022-09-27

## 2022-09-27 VITALS — HEIGHT: 65 IN | WEIGHT: 202.8 LBS | BODY MASS INDEX: 33.79 KG/M2

## 2022-09-27 DIAGNOSIS — R63.5 ABNORMAL WEIGHT GAIN: Primary | ICD-10-CM

## 2022-09-27 PROCEDURE — RECHECK

## 2022-09-27 PROCEDURE — VLCD

## 2022-09-27 NOTE — PROGRESS NOTES
Weight Management Medical Nutrition Assessment  Musa Britton was here today for her 8 week VLCD follow-up  Moncho Potts she weighs 202 lbs giving her a loss of 2 lbs in the last 2 weeks   She reports that she is not having a snack everyday, unless she walks and then will have a cheese stick or egg  Rahul Rowe is still trying to increase her water  Rahul Rowe has increased her steps daily and walked 3 times last week  Rahul Rowe is still losing weight very slowly this time and she is frustrated with that  Discussed that she may need to transition to a partial meal plan  She asked what else she could do since it is not working as well for this time  I strongly suggest adding food into the plan and going to a partial   Will discuss next time  Labs ordered and bp taken       Requested to schedule appointment with Medical Provider: No        Anthropometric Measurements  Start Weight (#): 227 2  Current Weight (#): 202  TBW % Change from start weight:13%  Ideal Body Weight (#):125  Goal Weight (#):175     Weight Loss History  Previous weight loss attempts: Commercial Programs (Weight Watchers, [place], etc )     Food and Nutrition Related History  Wake up: 6 am              Bed Time: 10 - 11     Food Recall  Breakfast:bar                Snack:cheese stick  Lunch:meal replacement  Snack:  Dinner:  meal replacement  Snack:        Beverages: water  Volume of beverage intake: 60 - 80     Weekends: Same  Cravings: sweets  Trouble area of day:afternoon     Frequency of Eating out: irregularly  Food restrictions:none  Cooking: self   Food Shopping: self     Physical Activity Intake  Activity:gardening and trying to walk when there time  (17602 steps daily)  Frequency:rarely  Physical limitations/barriers to exercise: none     Estimated Needs  Energy     [address]  [facility] Energy Needs:  BMR : 0176   1# loss weekly sedentary: 1366          1-2# loss weekly lightly active:1102 - 1602  Maintenance calories for sedentary activity level: 1866  Protein:68 - 85      (1 2-1 5g/kg IBW)  Fluid: 66    (35mL/kg IBW)     Nutrition Diagnosis  Yes;    Overweight/obesity  related to Excess energy intake as evidenced by  BMI more than normative standard for age and sex (obesity-grade II 35-39  9)     Nutrition Intervention     Nutrition Prescription  Calories:760  Protein:96  Fluid:80     Meal Plan (Ron/Pro/Carb)  Breakfast: 200/27/10  Snack:  Lunch:200/27/10  Snack:160/15/12  Dinner:200/27/10  Snack:     Nutrition Education:    Calorie controlled menu  Lean protein food choices  Healthy snack options  Food journaling tips        Nutrition Counseling:  Strategies: meal planning, portion sizes, healthy snack choices, hydration, fiber intake, protein intake, exercise, food journal        Monitoring and Evaluation:  Evaluation criteria:  Energy Intake  Meet protein needs  Maintain adequate hydration  Monitor weekly weight  Meal planning/preparation  Food journal   Decreased portions at mealtimes and snacks  Physical activity      Barriers to learning:none  Readiness to change: Action:  (Changing behavior)  Comprehension: good  Expected Compliance: good

## 2022-10-11 ENCOUNTER — OFFICE VISIT (OUTPATIENT)
Dept: BARIATRICS | Facility: CLINIC | Age: 47
End: 2022-10-11

## 2022-10-11 VITALS — WEIGHT: 200 LBS | HEIGHT: 65 IN | BODY MASS INDEX: 33.32 KG/M2

## 2022-10-11 DIAGNOSIS — R63.5 ABNORMAL WEIGHT GAIN: ICD-10-CM

## 2022-10-11 PROCEDURE — RECHECK

## 2022-10-11 PROCEDURE — VLCD

## 2022-10-11 NOTE — PROGRESS NOTES
Weight Management Medical Nutrition Assessment  María Elena Narayan had a virtual visit today for her 10 week VLCD follow-up due to Covid   Today she has a self reported weight 200 lbs giving her a loss of 2 lbs in the last 2 weeks   She reports that she is not having a snack everyday, unless she walks and then will have a cheese stick or egg  Tan Kunz is still trying to increase her water  Tan Kunz has increased her steps daily   She is still losing weight very slowly and continues to be frustrated  Maddoxjerry Kunz has 2 week left, and then will transisition to a partial meal plan      Requested to schedule appointment with Medical Provider: No        Anthropometric Measurements  Start Weight (#): 227 2  Current Weight (#): 200  TBW % Change from start weight:13%  Ideal Body Weight (#):125  Goal Weight (#):175     Weight Loss History  Previous weight loss attempts: Commercial Programs (Weight Watchers, Sonny Munoz, etc )     Food and Nutrition Related History  Wake up: 6 am              Bed Time: 10 - 11     Food Recall  Breakfast:bar                Snack:cheese stick  Lunch:meal replacement  Snack:  Dinner:  meal replacement  Snack:        Beverages: water  Volume of beverage intake: 60 - 80     Weekends: Same  Cravings: sweets  Trouble area of day:afternoon     Frequency of Eating out: irregularly  Food restrictions:none  Cooking: self   Food Shopping: self     Physical Activity Intake  Activity:gardening and trying to walk when there time  (43497 steps daily)  Frequency:rarely  Physical limitations/barriers to exercise: none     Estimated Needs  Energy     [address]  [facility] Needs:  BMR : 2979   2# loss weekly sedentary: 1366          1-2# loss weekly lightly active:1102 - 1602  Maintenance calories for sedentary activity level: 1866  Protein:68 - 85      (1 2-1 5g/kg IBW)  Fluid: 66    (35mL/kg IBW)     Nutrition Diagnosis  Yes;    Overweight/obesity  related to Excess energy intake as evidenced by  BMI more than normative standard for age and sex (obesity-grade II 32-38  9)     Nutrition Intervention     Nutrition Prescription  Calories:760  Protein:96  Fluid:80     Meal Plan (Ron/Pro/Carb)  Breakfast: 200/27/10  Snack:  Lunch:200/27/10  Snack:160/15/12  Dinner:200/27/10  Snack:     Nutrition Education:    Calorie controlled menu  Lean protein food choices  Healthy snack options  Food journaling tips        Nutrition Counseling:  Strategies: meal planning, portion sizes, healthy snack choices, hydration, fiber intake, protein intake, exercise, food journal        Monitoring and Evaluation:  Evaluation criteria:  Energy Intake  Meet protein needs  Maintain adequate hydration  Monitor weekly weight  Meal planning/preparation  Food journal   Decreased portions at mealtimes and snacks  Physical activity      Barriers to learning:none  Readiness to change: Action:  (Changing behavior)  Comprehension: good  Expected Compliance: good

## 2022-10-15 ENCOUNTER — APPOINTMENT (OUTPATIENT)
Dept: LAB | Facility: CLINIC | Age: 47
End: 2022-10-15
Payer: COMMERCIAL

## 2022-10-15 DIAGNOSIS — R63.5 ABNORMAL WEIGHT GAIN: ICD-10-CM

## 2022-10-15 LAB
ALBUMIN SERPL BCP-MCNC: 4 G/DL (ref 3.5–5)
ALP SERPL-CCNC: 60 U/L (ref 46–116)
ALT SERPL W P-5'-P-CCNC: 35 U/L (ref 12–78)
ANION GAP SERPL CALCULATED.3IONS-SCNC: 3 MMOL/L (ref 4–13)
AST SERPL W P-5'-P-CCNC: 15 U/L (ref 5–45)
BILIRUB SERPL-MCNC: 0.63 MG/DL (ref 0.2–1)
BUN SERPL-MCNC: 15 MG/DL (ref 5–25)
CALCIUM SERPL-MCNC: 9.2 MG/DL (ref 8.3–10.1)
CHLORIDE SERPL-SCNC: 105 MMOL/L (ref 96–108)
CO2 SERPL-SCNC: 31 MMOL/L (ref 21–32)
CREAT SERPL-MCNC: 0.91 MG/DL (ref 0.6–1.3)
GFR SERPL CREATININE-BSD FRML MDRD: 75 ML/MIN/1.73SQ M
GLUCOSE P FAST SERPL-MCNC: 111 MG/DL (ref 65–99)
MAGNESIUM SERPL-MCNC: 2.1 MG/DL (ref 1.6–2.6)
POTASSIUM SERPL-SCNC: 3.4 MMOL/L (ref 3.5–5.3)
PROT SERPL-MCNC: 7.1 G/DL (ref 6.4–8.4)
SODIUM SERPL-SCNC: 139 MMOL/L (ref 135–147)

## 2022-10-15 PROCEDURE — 36415 COLL VENOUS BLD VENIPUNCTURE: CPT

## 2022-10-15 PROCEDURE — 83735 ASSAY OF MAGNESIUM: CPT

## 2022-10-15 PROCEDURE — 80053 COMPREHEN METABOLIC PANEL: CPT

## 2022-10-16 ENCOUNTER — TELEPHONE (OUTPATIENT)
Dept: BARIATRICS | Facility: CLINIC | Age: 47
End: 2022-10-16

## 2022-10-16 DIAGNOSIS — E87.6 HYPOKALEMIA: Primary | ICD-10-CM

## 2022-10-16 RX ORDER — POTASSIUM CHLORIDE 1.5 G/1.77G
20 POWDER, FOR SOLUTION ORAL 2 TIMES DAILY
Qty: 30 EACH | Refills: 0 | Status: SHIPPED | OUTPATIENT
Start: 2022-10-16

## 2022-10-16 NOTE — TELEPHONE ENCOUNTER
Please let her know she has chronic low K   She needs to contact her PCP to discuss her diuretic use   I prescribed for her potassium replacement at the pharmacy she can pick it up

## 2022-10-25 ENCOUNTER — OFFICE VISIT (OUTPATIENT)
Dept: BARIATRICS | Facility: CLINIC | Age: 47
End: 2022-10-25

## 2022-10-25 DIAGNOSIS — R63.5 ABNORMAL WEIGHT GAIN: ICD-10-CM

## 2022-10-25 PROCEDURE — RECHECK

## 2022-10-25 PROCEDURE — VLCD

## 2022-10-25 NOTE — PROGRESS NOTES
Weight Management Medical Nutrition Assessment  Juan Paredes had a virtual visit today for her 12 week VLCD follow-up due to Covid   Today she has a self reported weight 200 2 lbs giving her a loss of 2 lbs in the last 2 weeks   She reports that she is not having a snack everyday, unless she walks and then will have a cheese stick or egg  Shon Gonzalez is still trying to increase her water  Shon Gonzalez has increased her steps daily   She is still losing weight very slowly and continues to be frustrated  Shon Gonzalez has 2 week left, and then will transisition to a partial meal plan       Requested to schedule appointment with Medical Provider: No        Anthropometric Measurements  Start Weight (#): 227 2  Current Weight (#): 200  TBW % Change from start weight:13%  Ideal Body Weight (#):125  Goal Weight (#):175     Weight Loss History  Previous weight loss attempts: Commercial Programs (Weight Watchers, Sampson Quarto, etc )     Food and Nutrition Related History  Wake up: 6 am              Bed Time: 10 - 11     Food Recall  Breakfast:bar                Snack:cheese stick  Lunch:meal replacement  Snack:  Dinner:  meal replacement  Snack:        Beverages: water  Volume of beverage intake: 60 - 80     Weekends: Same  Cravings: sweets  Trouble area of day:afternoon     Frequency of Eating out: irregularly  Food restrictions:none  Cooking: self   Food Shopping: self     Physical Activity Intake  Activity:gardening and trying to walk when there time  (58249 steps daily)  Frequency:rarely  Physical limitations/barriers to exercise: none     Estimated Needs  Energy     [address]  [facility] Energy Needs:  BMR : 2619   8# loss weekly sedentary: 1366          1-2# loss weekly lightly active:1102 - 1602  Maintenance calories for sedentary activity level: 1866  Protein:68 - 85      (1 2-1 5g/kg IBW)  Fluid: 66    (35mL/kg IBW)     Nutrition Diagnosis  Yes;    Overweight/obesity  related to Excess energy intake as evidenced by  BMI more than normative standard for age and sex (obesity-grade II 32-38  9)     Nutrition Intervention     Nutrition Prescription  Calories:760  Protein:96  Fluid:80     Meal Plan (Ron/Pro/Carb)  Breakfast: 200/27/10  Snack:  Lunch:200/27/10  Snack:160/15/12  Dinner:200/27/10  Snack:     Nutrition Education:    Calorie controlled menu  Lean protein food choices  Healthy snack options  Food journaling tips        Nutrition Counseling:  Strategies: meal planning, portion sizes, healthy snack choices, hydration, fiber intake, protein intake, exercise, food journal        Monitoring and Evaluation:  Evaluation criteria:  Energy Intake  Meet protein needs  Maintain adequate hydration  Monitor weekly weight  Meal planning/preparation  Food journal   Decreased portions at mealtimes and snacks  Physical activity      Barriers to learning:none  Readiness to change: Action:  (Changing behavior)  Comprehension: good  Expected Compliance: good

## 2022-11-21 ENCOUNTER — TELEPHONE (OUTPATIENT)
Dept: BARIATRICS | Facility: CLINIC | Age: 47
End: 2022-11-21